# Patient Record
Sex: MALE | Race: WHITE | Employment: FULL TIME | URBAN - METROPOLITAN AREA
[De-identification: names, ages, dates, MRNs, and addresses within clinical notes are randomized per-mention and may not be internally consistent; named-entity substitution may affect disease eponyms.]

---

## 2017-03-22 ENCOUNTER — APPOINTMENT (OUTPATIENT)
Dept: LAB | Facility: HOSPITAL | Age: 52
End: 2017-03-22
Attending: INTERNAL MEDICINE
Payer: COMMERCIAL

## 2017-03-22 DIAGNOSIS — E78.00 HIGH CHOLESTEROL: ICD-10-CM

## 2017-03-22 LAB
ALBUMIN SERPL-MCNC: 3.7 G/DL (ref 3.5–4.8)
ALP LIVER SERPL-CCNC: 49 U/L (ref 45–117)
ALT SERPL-CCNC: 47 U/L (ref 17–63)
AST SERPL-CCNC: 18 U/L (ref 15–41)
BILIRUB SERPL-MCNC: 0.3 MG/DL (ref 0.1–2)
BUN BLD-MCNC: 12 MG/DL (ref 8–20)
CALCIUM BLD-MCNC: 8.9 MG/DL (ref 8.3–10.3)
CHLORIDE: 109 MMOL/L (ref 101–111)
CHOLEST SMN-MCNC: 203 MG/DL (ref ?–200)
CO2: 29 MMOL/L (ref 22–32)
CREAT BLD-MCNC: 1.07 MG/DL (ref 0.7–1.3)
GLUCOSE BLD-MCNC: 116 MG/DL (ref 70–99)
HDLC SERPL-MCNC: 38 MG/DL (ref 45–?)
HDLC SERPL: 5.34 {RATIO} (ref ?–4.97)
LDLC SERPL CALC-MCNC: 114 MG/DL (ref ?–130)
M PROTEIN MFR SERPL ELPH: 7.1 G/DL (ref 6.1–8.3)
NONHDLC SERPL-MCNC: 165 MG/DL (ref ?–130)
POTASSIUM SERPL-SCNC: 4.3 MMOL/L (ref 3.6–5.1)
SODIUM SERPL-SCNC: 143 MMOL/L (ref 136–144)
TRIGLYCERIDES: 257 MG/DL (ref ?–150)
VLDL: 51 MG/DL (ref 5–40)

## 2017-03-22 PROCEDURE — 80061 LIPID PANEL: CPT

## 2017-03-22 PROCEDURE — 80053 COMPREHEN METABOLIC PANEL: CPT

## 2017-03-22 PROCEDURE — 36415 COLL VENOUS BLD VENIPUNCTURE: CPT

## 2017-03-30 ENCOUNTER — OFFICE VISIT (OUTPATIENT)
Dept: INTERNAL MEDICINE CLINIC | Facility: CLINIC | Age: 52
End: 2017-03-30

## 2017-03-30 VITALS
HEART RATE: 84 BPM | RESPIRATION RATE: 16 BRPM | WEIGHT: 239 LBS | HEIGHT: 72 IN | DIASTOLIC BLOOD PRESSURE: 80 MMHG | SYSTOLIC BLOOD PRESSURE: 124 MMHG | BODY MASS INDEX: 32.37 KG/M2 | TEMPERATURE: 98 F

## 2017-03-30 DIAGNOSIS — E78.5 DYSLIPIDEMIA: Primary | ICD-10-CM

## 2017-03-30 DIAGNOSIS — R73.03 PRE-DIABETES: ICD-10-CM

## 2017-03-30 PROCEDURE — 99213 OFFICE O/P EST LOW 20 MIN: CPT | Performed by: INTERNAL MEDICINE

## 2017-03-30 RX ORDER — ROSUVASTATIN CALCIUM 5 MG/1
5 TABLET, COATED ORAL NIGHTLY
Qty: 90 TABLET | Refills: 2 | Status: SHIPPED | OUTPATIENT
Start: 2017-03-30 | End: 2019-05-17

## 2017-03-30 NOTE — PROGRESS NOTES
Patient presents with: Follow - Up: to review labs. HPI:  Here for f.u high chol and pre diabtes, sugar up a bit, chol down, tkain meds more, about 80% of the time. Eating poorly. Review of Systems   No f/c/chest pain or sob. No cough.  No abd pa edema    A/P:    Dyslipidemia  (primary encounter diagnosis)  Pre-diabetes    Continue meds, take more regularly  Rpt labs in 3 mos see me in 6 mos for cpe    Orders Placed This Encounter  CMP in 3 months  Hemoglobin A1C in 3 months    Meds & Refills for t

## 2017-06-20 ENCOUNTER — TELEPHONE (OUTPATIENT)
Dept: INTERNAL MEDICINE CLINIC | Facility: CLINIC | Age: 52
End: 2017-06-20

## 2017-06-23 ENCOUNTER — OFFICE VISIT (OUTPATIENT)
Dept: INTERNAL MEDICINE CLINIC | Facility: CLINIC | Age: 52
End: 2017-06-23

## 2017-06-23 VITALS
HEART RATE: 68 BPM | BODY MASS INDEX: 33.32 KG/M2 | SYSTOLIC BLOOD PRESSURE: 118 MMHG | WEIGHT: 238 LBS | DIASTOLIC BLOOD PRESSURE: 74 MMHG | TEMPERATURE: 98 F | RESPIRATION RATE: 16 BRPM | HEIGHT: 71 IN

## 2017-06-23 DIAGNOSIS — M79.671 HEEL PAIN, BILATERAL: ICD-10-CM

## 2017-06-23 DIAGNOSIS — R73.03 PRE-DIABETES: ICD-10-CM

## 2017-06-23 DIAGNOSIS — R73.9 HYPERGLYCEMIA: Primary | ICD-10-CM

## 2017-06-23 DIAGNOSIS — E78.5 DYSLIPIDEMIA: ICD-10-CM

## 2017-06-23 DIAGNOSIS — M79.672 HEEL PAIN, BILATERAL: ICD-10-CM

## 2017-06-23 PROCEDURE — 99214 OFFICE O/P EST MOD 30 MIN: CPT | Performed by: INTERNAL MEDICINE

## 2017-06-23 NOTE — PROGRESS NOTES
Patient presents with: Follow - Up      HPI:  Here for f/u high chol and pre diabetes. Pt notes poor compliance with daily crestor.    Notes ravin heel pain upon standing in am, no swelling, pain improves as he walks, worsens after resting then walking again Non tender, no masses, no organomegaly or hernias.   Musculoskeletal: No edema; ravin heel pain on exam    A/P:    Hyperglycemia  (primary encounter diagnosis)-stable continue observation, rpt lab snow  Dyslipidemia-see above, advised better adherence with me

## 2017-06-24 ENCOUNTER — APPOINTMENT (OUTPATIENT)
Dept: LAB | Facility: HOSPITAL | Age: 52
End: 2017-06-24
Attending: INTERNAL MEDICINE
Payer: COMMERCIAL

## 2017-06-24 DIAGNOSIS — R73.03 PRE-DIABETES: ICD-10-CM

## 2017-06-24 DIAGNOSIS — R73.9 HYPERGLYCEMIA: ICD-10-CM

## 2017-06-24 DIAGNOSIS — E78.5 DYSLIPIDEMIA: ICD-10-CM

## 2017-06-24 PROCEDURE — 80053 COMPREHEN METABOLIC PANEL: CPT

## 2017-06-24 PROCEDURE — 83036 HEMOGLOBIN GLYCOSYLATED A1C: CPT

## 2017-06-24 PROCEDURE — 36415 COLL VENOUS BLD VENIPUNCTURE: CPT

## 2017-06-24 PROCEDURE — 80061 LIPID PANEL: CPT

## 2017-06-26 DIAGNOSIS — R73.03 PRE-DIABETES: ICD-10-CM

## 2017-06-26 DIAGNOSIS — R73.9 HYPERGLYCEMIA: Primary | ICD-10-CM

## 2017-06-26 DIAGNOSIS — E78.5 DYSLIPIDEMIA: ICD-10-CM

## 2017-07-18 ENCOUNTER — HOSPITAL ENCOUNTER (OUTPATIENT)
Dept: GENERAL RADIOLOGY | Facility: HOSPITAL | Age: 52
Discharge: HOME | End: 2017-07-18
Attending: NURSE PRACTITIONER
Payer: COMMERCIAL

## 2017-07-18 ENCOUNTER — OFFICE VISIT (OUTPATIENT)
Dept: INTERNAL MEDICINE CLINIC | Facility: CLINIC | Age: 52
End: 2017-07-18

## 2017-07-18 VITALS
RESPIRATION RATE: 18 BRPM | BODY MASS INDEX: 33.43 KG/M2 | DIASTOLIC BLOOD PRESSURE: 68 MMHG | SYSTOLIC BLOOD PRESSURE: 104 MMHG | HEART RATE: 103 BPM | WEIGHT: 238.81 LBS | TEMPERATURE: 99 F | HEIGHT: 71 IN | OXYGEN SATURATION: 96 %

## 2017-07-18 DIAGNOSIS — R09.89 CHEST CONGESTION: Primary | ICD-10-CM

## 2017-07-18 DIAGNOSIS — J02.9 SORE THROAT: ICD-10-CM

## 2017-07-18 DIAGNOSIS — R09.89 CHEST CONGESTION: ICD-10-CM

## 2017-07-18 DIAGNOSIS — R05.9 COUGH: ICD-10-CM

## 2017-07-18 DIAGNOSIS — R50.9 FEVER, UNSPECIFIED FEVER CAUSE: ICD-10-CM

## 2017-07-18 PROCEDURE — 99214 OFFICE O/P EST MOD 30 MIN: CPT | Performed by: NURSE PRACTITIONER

## 2017-07-18 PROCEDURE — 71020 XR CHEST PA + LAT CHEST (CPT=71020): CPT | Performed by: NURSE PRACTITIONER

## 2017-07-18 NOTE — PROGRESS NOTES
Patient presents with:  Flu: Room 7. Chest congestion       HPI:  Presents with 3 day history of chest congestion, fatigue, cough with production of \"thick dark\" green mucous, sore throat, nasal drainage and low grade fevers.  Denies SOB, LEDEZMA, chest pain, rales appreciated to left lung. Frequent cough heard during exam.    Skin: Skin is warm and dry. No rash noted. No erythema. No pallor. A/P:    Chest congestion  (primary encounter diagnosis)- Check CXR. Further treatment dependent on results.  Nasal bed).     Get plenty of rest and drink extra fluids.              All questions were answered and the patient understands the plan.

## 2018-05-08 ENCOUNTER — TELEPHONE (OUTPATIENT)
Dept: INTERNAL MEDICINE CLINIC | Facility: CLINIC | Age: 53
End: 2018-05-08

## 2018-05-08 NOTE — TELEPHONE ENCOUNTER
Future Appointments  Date Time Provider Mary Lau   5/22/2018 11:15 AM Jannis Boast, MD [facility]

## 2018-05-14 ENCOUNTER — LAB ENCOUNTER (OUTPATIENT)
Dept: LAB | Facility: HOSPITAL | Age: 53
End: 2018-05-14
Attending: INTERNAL MEDICINE
Payer: COMMERCIAL

## 2018-05-14 DIAGNOSIS — E78.5 DYSLIPIDEMIA: ICD-10-CM

## 2018-05-14 DIAGNOSIS — R73.03 PRE-DIABETES: ICD-10-CM

## 2018-05-14 DIAGNOSIS — R73.9 HYPERGLYCEMIA: ICD-10-CM

## 2018-05-14 PROCEDURE — 80061 LIPID PANEL: CPT

## 2018-05-14 PROCEDURE — 36415 COLL VENOUS BLD VENIPUNCTURE: CPT

## 2018-05-14 PROCEDURE — 83036 HEMOGLOBIN GLYCOSYLATED A1C: CPT

## 2018-05-14 PROCEDURE — 80053 COMPREHEN METABOLIC PANEL: CPT

## 2018-05-22 ENCOUNTER — OFFICE VISIT (OUTPATIENT)
Dept: INTERNAL MEDICINE CLINIC | Facility: CLINIC | Age: 53
End: 2018-05-22

## 2018-05-22 VITALS
BODY MASS INDEX: 32.37 KG/M2 | HEART RATE: 76 BPM | DIASTOLIC BLOOD PRESSURE: 70 MMHG | HEIGHT: 72 IN | TEMPERATURE: 98 F | WEIGHT: 239 LBS | SYSTOLIC BLOOD PRESSURE: 116 MMHG | RESPIRATION RATE: 14 BRPM

## 2018-05-22 DIAGNOSIS — R73.03 PRE-DIABETES: ICD-10-CM

## 2018-05-22 DIAGNOSIS — Z00.00 PE (PHYSICAL EXAM), ANNUAL: Primary | ICD-10-CM

## 2018-05-22 DIAGNOSIS — K21.9 GASTROESOPHAGEAL REFLUX DISEASE, ESOPHAGITIS PRESENCE NOT SPECIFIED: ICD-10-CM

## 2018-05-22 DIAGNOSIS — R20.2 ARM PARESTHESIA, RIGHT: ICD-10-CM

## 2018-05-22 DIAGNOSIS — E78.5 DYSLIPIDEMIA: ICD-10-CM

## 2018-05-22 PROCEDURE — 99396 PREV VISIT EST AGE 40-64: CPT | Performed by: INTERNAL MEDICINE

## 2018-05-22 NOTE — PROGRESS NOTES
Patient presents with:  Physical: AB RM 9  Lab Results      HPI:  Here for cpe. Pt has left arm paresthesia intemttent entire length worse in am usually. Denies pain, recall left elbow injury fell onit durinig tennis. Lasting 3 mos, mild.    No other comm Mother    • Heart Disease Father      Smoking status: Light Tobacco Smoker                                                       Packs/day: 0.00      Years: 0.00         Types: Cigars  Smokeless tobacco: Never Used                      Comment: Cigars 3-4 Neurological: Normal reflexes. No cranial nerve deficit or sensory deficit. Normal muscle tone. Coordination normal.   Skin: Skin is warm and dry. No rash noted. No erythema. No pallor. Psychiatric: Normal mood and affect.      A/P:    Gastroesophageal

## 2018-05-30 ENCOUNTER — OFFICE VISIT (OUTPATIENT)
Dept: INTERNAL MEDICINE CLINIC | Facility: CLINIC | Age: 53
End: 2018-05-30

## 2018-05-30 VITALS
SYSTOLIC BLOOD PRESSURE: 122 MMHG | HEIGHT: 72 IN | BODY MASS INDEX: 32.37 KG/M2 | WEIGHT: 239 LBS | RESPIRATION RATE: 16 BRPM | TEMPERATURE: 99 F | OXYGEN SATURATION: 97 % | HEART RATE: 96 BPM | DIASTOLIC BLOOD PRESSURE: 70 MMHG

## 2018-05-30 DIAGNOSIS — J06.9 ACUTE URI: Primary | ICD-10-CM

## 2018-05-30 PROCEDURE — 99213 OFFICE O/P EST LOW 20 MIN: CPT | Performed by: PHYSICIAN ASSISTANT

## 2018-05-30 RX ORDER — AZITHROMYCIN 250 MG/1
TABLET, FILM COATED ORAL
Qty: 6 TABLET | Refills: 0 | Status: SHIPPED | OUTPATIENT
Start: 2018-05-30 | End: 2019-05-17 | Stop reason: ALTCHOICE

## 2018-05-30 NOTE — PROGRESS NOTES
HPI:   Siri Gonzalez is a 46year old male who presents for upper respiratory symptoms for  4  days.    Patient reports congestion, yellow colored nasal discharge, cough with yellow colored sputum, OTC cold meds have not been helping, denies fever, denies sin nourished,in no apparent distress  EYES:PERRLA,, conjunctiva are clear  HEENT: atraumatic, normocephalic, B TMs clear, MMM o/p with mild erythema of tonsillar pillars, no exudates  NECK: supple, no cervical adenopathy  LUNGS: clear to auscultation, no whee

## 2018-09-03 ENCOUNTER — OFFICE VISIT (OUTPATIENT)
Dept: FAMILY MEDICINE CLINIC | Facility: CLINIC | Age: 53
End: 2018-09-03
Payer: COMMERCIAL

## 2018-09-03 VITALS
HEIGHT: 72 IN | TEMPERATURE: 99 F | SYSTOLIC BLOOD PRESSURE: 122 MMHG | WEIGHT: 235 LBS | DIASTOLIC BLOOD PRESSURE: 72 MMHG | HEART RATE: 99 BPM | OXYGEN SATURATION: 98 % | BODY MASS INDEX: 31.83 KG/M2

## 2018-09-03 DIAGNOSIS — J06.9 UPPER RESPIRATORY TRACT INFECTION, UNSPECIFIED TYPE: Primary | ICD-10-CM

## 2018-09-03 PROCEDURE — 99213 OFFICE O/P EST LOW 20 MIN: CPT | Performed by: PHYSICIAN ASSISTANT

## 2018-09-03 NOTE — PROGRESS NOTES
CHIEF COMPLAINT:   Patient presents with:  Chest Congestion: cough is with phlegm, runny nose x 1 dy       HPI:   Janelle Smith is a 46year old male who presents for upper respiratory symptoms for  1 days.  Patient reports congestion, cough is keeping pt up GENERAL: well developed, well nourished,in no apparent distress  SKIN: no rashes,no suspicious lesions  HEAD: atraumatic, normocephalic.  no tenderness on palpation of  sinuses  EYES: conjunctiva clear, EOM intact  EARS: TM's nl, no bulging, noretraction,n You have a viral upper respiratory illness (URI), which is another term for the common cold. This illness is contagious during the first few days. It is spread through the air by coughing and sneezing.  It may also be spread by direct contact (touching the · Cough with lots of colored sputum (mucus)  · Severe headache; face, neck, or ear pain  · Difficulty swallowing due to throat pain  · Fever of 100.4°F (38°C) or higher, or as directed by your healthcare provider  Call 911  Call 911 if any of these occur:

## 2018-09-06 ENCOUNTER — PROCEDURE VISIT (OUTPATIENT)
Dept: NEUROLOGY | Facility: CLINIC | Age: 53
End: 2018-09-06
Payer: COMMERCIAL

## 2018-09-06 DIAGNOSIS — G56.03 BILATERAL CARPAL TUNNEL SYNDROME: ICD-10-CM

## 2018-09-06 PROCEDURE — 95911 NRV CNDJ TEST 9-10 STUDIES: CPT | Performed by: OTHER

## 2018-09-06 PROCEDURE — 95886 MUSC TEST DONE W/N TEST COMP: CPT | Performed by: OTHER

## 2018-09-06 NOTE — PROCEDURES
[facility]  [address] [facility]  Phone- [phone]  Nerve Conduction & Electromyography Report            Patient: Doe Lew Age: 46 Years 4 Months  Patient ID: [id number] Referring MPATRIA: Schriedel Se Wrist APB 4.38 ?4.40 7.8 ?6.0 100  100 ?50.00 Wrist - APB 7        Elbow APB 8.75  7.3  92.9 ?115 100  Elbow - Wrist 22 50 ?49   R ULNAR - ADM      Wrist ADM 2.66 ?3.60 9.8 ?5.0 100  72.7 ?50.00 Wrist - ADM 7        B. Elbow ADM 6.04  9.3  94.5 ?115 73. 6 There is no electrodiagnostic evidence of a cervical radiculopathy at this time. Anu Stockton D.O.   Neurology

## 2018-09-06 NOTE — PATIENT INSTRUCTIONS
Refill policies:    • Allow 2-3 business days for refills; controlled substances may take longer.   • Contact your pharmacy at least 5 days prior to running out of medication and have them send an electronic request or submit request through the “request re entire amount billed. Precertification and Prior Authorizations: If your physician has recommended that you have a procedure or additional testing performed.   [facility]) will contact your insurance carrier to obtain pre-certi

## 2019-05-16 ENCOUNTER — TELEPHONE (OUTPATIENT)
Dept: INTERNAL MEDICINE CLINIC | Facility: CLINIC | Age: 54
End: 2019-05-16

## 2019-05-16 DIAGNOSIS — E78.5 DYSLIPIDEMIA: ICD-10-CM

## 2019-05-16 DIAGNOSIS — Z12.5 SCREENING FOR MALIGNANT NEOPLASM OF PROSTATE: ICD-10-CM

## 2019-05-16 DIAGNOSIS — R73.9 HYPERGLYCEMIA: ICD-10-CM

## 2019-05-16 DIAGNOSIS — R73.03 PRE-DIABETES: Primary | ICD-10-CM

## 2019-05-16 DIAGNOSIS — K21.9 GASTROESOPHAGEAL REFLUX DISEASE WITHOUT ESOPHAGITIS: ICD-10-CM

## 2019-05-16 NOTE — TELEPHONE ENCOUNTER
Future Appointments   Date Time Provider Mary Lau   5/17/2019 11:45 AM Jacque Pereira MD [facility]   5/31/2019  9:45 AM Karlos Hinkle MD [facility]     Orders to edward- Pt aware to fast-no call back required

## 2019-05-17 ENCOUNTER — OFFICE VISIT (OUTPATIENT)
Dept: INTERNAL MEDICINE CLINIC | Facility: CLINIC | Age: 54
End: 2019-05-17
Payer: COMMERCIAL

## 2019-05-17 VITALS
HEIGHT: 72 IN | RESPIRATION RATE: 16 BRPM | HEART RATE: 104 BPM | SYSTOLIC BLOOD PRESSURE: 102 MMHG | WEIGHT: 240 LBS | BODY MASS INDEX: 32.51 KG/M2 | DIASTOLIC BLOOD PRESSURE: 60 MMHG | TEMPERATURE: 99 F

## 2019-05-17 DIAGNOSIS — J01.90 ACUTE RHINOSINUSITIS: Primary | ICD-10-CM

## 2019-05-17 PROCEDURE — 99213 OFFICE O/P EST LOW 20 MIN: CPT | Performed by: INTERNAL MEDICINE

## 2019-05-17 NOTE — PROGRESS NOTES
Farida Jacobs  4/15/1965    Patient presents with:  Cough: SN Rm 7; x 20 days, productive cough, fatigue, some SOB      SUBJECTIVE   Farida Jacobs is a 53year old male who presents with nasal and sinus congestion, with cough.     Approximately 10 days ago foll Position: Sitting, Cuff Size: adult)   Pulse 104   Temp 98.5 °F (36.9 °C) (Oral)   Resp 16   Ht 72\"   Wt 240 lb   BMI 32.55 kg/m²   Constitutional: Oriented to person, place, and time. No distress. HEENT:  Normocephalic and atraumatic. TM's wnl.   Maxilla

## 2019-07-23 ENCOUNTER — LAB ENCOUNTER (OUTPATIENT)
Dept: LAB | Facility: HOSPITAL | Age: 54
End: 2019-07-23
Attending: INTERNAL MEDICINE
Payer: COMMERCIAL

## 2019-07-23 DIAGNOSIS — R73.03 PRE-DIABETES: ICD-10-CM

## 2019-07-23 DIAGNOSIS — Z12.5 SCREENING FOR MALIGNANT NEOPLASM OF PROSTATE: ICD-10-CM

## 2019-07-23 DIAGNOSIS — E78.5 DYSLIPIDEMIA: ICD-10-CM

## 2019-07-23 DIAGNOSIS — R73.9 HYPERGLYCEMIA: ICD-10-CM

## 2019-07-23 DIAGNOSIS — K21.9 GASTROESOPHAGEAL REFLUX DISEASE WITHOUT ESOPHAGITIS: ICD-10-CM

## 2019-07-23 LAB
ALBUMIN SERPL-MCNC: 3.7 G/DL (ref 3.4–5)
ALBUMIN/GLOB SERPL: 1.1 {RATIO} (ref 1–2)
ALP LIVER SERPL-CCNC: 46 U/L (ref 45–117)
ALT SERPL-CCNC: 46 U/L (ref 16–61)
ANION GAP SERPL CALC-SCNC: 7 MMOL/L (ref 0–18)
AST SERPL-CCNC: 23 U/L (ref 15–37)
BASOPHILS # BLD AUTO: 0.08 X10(3) UL (ref 0–0.2)
BASOPHILS NFR BLD AUTO: 1.3 %
BILIRUB SERPL-MCNC: 0.6 MG/DL (ref 0.1–2)
BUN BLD-MCNC: 18 MG/DL (ref 7–18)
BUN/CREAT SERPL: 15.5 (ref 10–20)
CALCIUM BLD-MCNC: 9 MG/DL (ref 8.5–10.1)
CHLORIDE SERPL-SCNC: 110 MMOL/L (ref 98–112)
CHOLEST SMN-MCNC: 233 MG/DL (ref ?–200)
CO2 SERPL-SCNC: 25 MMOL/L (ref 21–32)
COMPLEXED PSA SERPL-MCNC: 3.11 NG/ML (ref ?–4)
CREAT BLD-MCNC: 1.16 MG/DL (ref 0.7–1.3)
DEPRECATED RDW RBC AUTO: 44.4 FL (ref 35.1–46.3)
EOSINOPHIL # BLD AUTO: 0.25 X10(3) UL (ref 0–0.7)
EOSINOPHIL NFR BLD AUTO: 4.1 %
ERYTHROCYTE [DISTWIDTH] IN BLOOD BY AUTOMATED COUNT: 13.2 % (ref 11–15)
EST. AVERAGE GLUCOSE BLD GHB EST-MCNC: 137 MG/DL (ref 68–126)
GLOBULIN PLAS-MCNC: 3.5 G/DL (ref 2.8–4.4)
GLUCOSE BLD-MCNC: 113 MG/DL (ref 70–99)
HBA1C MFR BLD HPLC: 6.4 % (ref ?–5.7)
HCT VFR BLD AUTO: 45.6 % (ref 39–53)
HDLC SERPL-MCNC: 38 MG/DL (ref 40–59)
HGB BLD-MCNC: 15.1 G/DL (ref 13–17.5)
IMM GRANULOCYTES # BLD AUTO: 0.03 X10(3) UL (ref 0–1)
IMM GRANULOCYTES NFR BLD: 0.5 %
LDLC SERPL CALC-MCNC: 130 MG/DL (ref ?–100)
LYMPHOCYTES # BLD AUTO: 2.4 X10(3) UL (ref 1–4)
LYMPHOCYTES NFR BLD AUTO: 39.2 %
M PROTEIN MFR SERPL ELPH: 7.2 G/DL (ref 6.4–8.2)
MCH RBC QN AUTO: 30.6 PG (ref 26–34)
MCHC RBC AUTO-ENTMCNC: 33.1 G/DL (ref 31–37)
MCV RBC AUTO: 92.3 FL (ref 80–100)
MONOCYTES # BLD AUTO: 0.56 X10(3) UL (ref 0.1–1)
MONOCYTES NFR BLD AUTO: 9.2 %
NEUTROPHILS # BLD AUTO: 2.8 X10 (3) UL (ref 1.5–7.7)
NEUTROPHILS # BLD AUTO: 2.8 X10(3) UL (ref 1.5–7.7)
NEUTROPHILS NFR BLD AUTO: 45.7 %
NONHDLC SERPL-MCNC: 195 MG/DL (ref ?–130)
OSMOLALITY SERPL CALC.SUM OF ELEC: 297 MOSM/KG (ref 275–295)
PLATELET # BLD AUTO: 192 10(3)UL (ref 150–450)
POTASSIUM SERPL-SCNC: 4.3 MMOL/L (ref 3.5–5.1)
RBC # BLD AUTO: 4.94 X10(6)UL (ref 4.3–5.7)
SODIUM SERPL-SCNC: 142 MMOL/L (ref 136–145)
TRIGL SERPL-MCNC: 327 MG/DL (ref 30–149)
TSI SER-ACNC: 1.31 MIU/ML (ref 0.36–3.74)
VLDLC SERPL CALC-MCNC: 65 MG/DL (ref 0–30)
WBC # BLD AUTO: 6.1 X10(3) UL (ref 4–11)

## 2019-07-23 PROCEDURE — 83036 HEMOGLOBIN GLYCOSYLATED A1C: CPT

## 2019-07-23 PROCEDURE — 85025 COMPLETE CBC W/AUTO DIFF WBC: CPT

## 2019-07-23 PROCEDURE — 80061 LIPID PANEL: CPT

## 2019-07-23 PROCEDURE — 36415 COLL VENOUS BLD VENIPUNCTURE: CPT

## 2019-07-23 PROCEDURE — 80053 COMPREHEN METABOLIC PANEL: CPT

## 2019-07-23 PROCEDURE — 84443 ASSAY THYROID STIM HORMONE: CPT

## 2019-09-06 ENCOUNTER — OFFICE VISIT (OUTPATIENT)
Dept: INTERNAL MEDICINE CLINIC | Facility: CLINIC | Age: 54
End: 2019-09-06
Payer: COMMERCIAL

## 2019-09-06 VITALS
BODY MASS INDEX: 32.64 KG/M2 | OXYGEN SATURATION: 97 % | HEIGHT: 72 IN | WEIGHT: 241 LBS | SYSTOLIC BLOOD PRESSURE: 122 MMHG | DIASTOLIC BLOOD PRESSURE: 82 MMHG | RESPIRATION RATE: 16 BRPM | TEMPERATURE: 98 F | HEART RATE: 98 BPM

## 2019-09-06 DIAGNOSIS — R73.03 PRE-DIABETES: ICD-10-CM

## 2019-09-06 DIAGNOSIS — Z00.00 PE (PHYSICAL EXAM), ANNUAL: Primary | ICD-10-CM

## 2019-09-06 DIAGNOSIS — E78.5 DYSLIPIDEMIA: ICD-10-CM

## 2019-09-06 DIAGNOSIS — Z23 NEED FOR VACCINATION: ICD-10-CM

## 2019-09-06 PROCEDURE — 90471 IMMUNIZATION ADMIN: CPT | Performed by: INTERNAL MEDICINE

## 2019-09-06 PROCEDURE — 99396 PREV VISIT EST AGE 40-64: CPT | Performed by: INTERNAL MEDICINE

## 2019-09-06 PROCEDURE — 90686 IIV4 VACC NO PRSV 0.5 ML IM: CPT | Performed by: INTERNAL MEDICINE

## 2019-09-06 RX ORDER — METFORMIN HYDROCHLORIDE 500 MG/1
500 TABLET, EXTENDED RELEASE ORAL DAILY
Qty: 90 TABLET | Refills: 1 | Status: SHIPPED | OUTPATIENT
Start: 2019-09-06 | End: 2019-12-09

## 2019-09-06 RX ORDER — ROSUVASTATIN CALCIUM 5 MG/1
5 TABLET, COATED ORAL NIGHTLY
Qty: 90 TABLET | Refills: 2 | Status: SHIPPED | OUTPATIENT
Start: 2019-09-06 | End: 2019-12-09

## 2019-09-06 NOTE — PROGRESS NOTES
Patient presents with:  Physical      HPI:  Here for cpe. Has inceased sugar and chol, not taking meds, eating poorly. Review of Systems   Constitutional: Negative for fever, chills and fatigue. No distress.   HENT: Negative for hearing loss, congesti Tobacco comment: Cigars 3-4    Alcohol use:  Yes      Alcohol/week: 0.0 standard drinks      Frequency: Monthly or less      Comment: Cage done 5/22/2018    Drug use: No        Current Outpatient Medications:  Rosuvastatin Calcium 5 MG Oral Tab Take 1 table sensory deficit. Normal muscle tone. Coordination normal.   Skin: Skin is warm and dry. No rash noted. No erythema. No pallor. Psychiatric: Normal mood and affect.    Diabetic foot exam: Normal barefoot bilateral monofilament foot exam, no wounds on visua

## 2019-12-03 ENCOUNTER — LAB ENCOUNTER (OUTPATIENT)
Dept: LAB | Facility: HOSPITAL | Age: 54
End: 2019-12-03
Attending: INTERNAL MEDICINE
Payer: COMMERCIAL

## 2019-12-03 DIAGNOSIS — E78.5 DYSLIPIDEMIA: ICD-10-CM

## 2019-12-03 DIAGNOSIS — R73.03 PRE-DIABETES: ICD-10-CM

## 2019-12-03 PROCEDURE — 82570 ASSAY OF URINE CREATININE: CPT

## 2019-12-03 PROCEDURE — 36415 COLL VENOUS BLD VENIPUNCTURE: CPT

## 2019-12-03 PROCEDURE — 80061 LIPID PANEL: CPT

## 2019-12-03 PROCEDURE — 83036 HEMOGLOBIN GLYCOSYLATED A1C: CPT

## 2019-12-03 PROCEDURE — 82043 UR ALBUMIN QUANTITATIVE: CPT

## 2019-12-03 PROCEDURE — 80053 COMPREHEN METABOLIC PANEL: CPT

## 2019-12-09 ENCOUNTER — OFFICE VISIT (OUTPATIENT)
Dept: INTERNAL MEDICINE CLINIC | Facility: CLINIC | Age: 54
End: 2019-12-09
Payer: COMMERCIAL

## 2019-12-09 VITALS
RESPIRATION RATE: 16 BRPM | SYSTOLIC BLOOD PRESSURE: 128 MMHG | HEIGHT: 72 IN | HEART RATE: 92 BPM | WEIGHT: 237 LBS | TEMPERATURE: 99 F | OXYGEN SATURATION: 98 % | BODY MASS INDEX: 32.1 KG/M2 | DIASTOLIC BLOOD PRESSURE: 78 MMHG

## 2019-12-09 DIAGNOSIS — R73.03 PRE-DIABETES: ICD-10-CM

## 2019-12-09 DIAGNOSIS — E78.5 DYSLIPIDEMIA: ICD-10-CM

## 2019-12-09 PROCEDURE — 99213 OFFICE O/P EST LOW 20 MIN: CPT | Performed by: INTERNAL MEDICINE

## 2019-12-09 RX ORDER — METFORMIN HYDROCHLORIDE 500 MG/1
500 TABLET, EXTENDED RELEASE ORAL DAILY
Qty: 90 TABLET | Refills: 2 | Status: SHIPPED | OUTPATIENT
Start: 2019-12-09 | End: 2020-09-17

## 2019-12-09 RX ORDER — ROSUVASTATIN CALCIUM 5 MG/1
5 TABLET, COATED ORAL NIGHTLY
Qty: 90 TABLET | Refills: 2 | Status: SHIPPED | OUTPATIENT
Start: 2019-12-09 | End: 2020-09-17

## 2019-12-09 RX ORDER — ROSUVASTATIN CALCIUM 5 MG/1
5 TABLET, COATED ORAL NIGHTLY
Qty: 90 TABLET | Refills: 2 | Status: SHIPPED | OUTPATIENT
Start: 2019-12-09 | End: 2019-12-09

## 2019-12-09 RX ORDER — METFORMIN HYDROCHLORIDE 500 MG/1
500 TABLET, EXTENDED RELEASE ORAL DAILY
Qty: 90 TABLET | Refills: 1 | Status: SHIPPED | OUTPATIENT
Start: 2019-12-09 | End: 2019-12-09

## 2019-12-09 NOTE — PROGRESS NOTES
Patient presents with:  Lab Results: RG rm 8 F/u labs      HPI:  Here for f/u highchol and pre dm, much improved on meds, no se's. Feels well. Review of Systems   No f/c/chest pain or sob. No cough. No abd pain/n/v/d. No ha or dizziness.  No numbness, t A/P:    Dyslipidemia  Pre-diabetes  Much improved, continue meds  Rpt labs and see me next summer for cpe  No orders of the defined types were placed in this encounter.       Meds & Refills for this Visit:  Requested Prescriptions     Signed Prescriptio

## 2020-09-15 DIAGNOSIS — E78.5 DYSLIPIDEMIA: ICD-10-CM

## 2020-09-15 DIAGNOSIS — R73.03 PRE-DIABETES: ICD-10-CM

## 2020-09-17 RX ORDER — ROSUVASTATIN CALCIUM 5 MG/1
TABLET, COATED ORAL
Qty: 90 TABLET | Refills: 0 | Status: SHIPPED | OUTPATIENT
Start: 2020-09-17 | End: 2020-12-15

## 2020-09-17 RX ORDER — METFORMIN HYDROCHLORIDE 500 MG/1
TABLET, EXTENDED RELEASE ORAL
Qty: 90 TABLET | Refills: 2 | Status: SHIPPED | OUTPATIENT
Start: 2020-09-17 | End: 2021-03-12

## 2020-09-17 NOTE — TELEPHONE ENCOUNTER
Rosuvastatin-PASSED per protocol, refill sent     Last OV 12.9.19 w/ AS (lab results)   Last PE 9.6.19-Overdue   Last REFILL 12.9.19 Metformin ER 500mg #90 2R  Last LABS 12. 3.19 Microalb, HgA1c, Lipid, CMP     No future appointments. Per PROTOCOL?  DAV

## 2020-12-14 ENCOUNTER — TELEPHONE (OUTPATIENT)
Dept: INTERNAL MEDICINE CLINIC | Facility: CLINIC | Age: 55
End: 2020-12-14

## 2020-12-14 DIAGNOSIS — Z13.29 SCREENING FOR THYROID DISORDER: ICD-10-CM

## 2020-12-14 DIAGNOSIS — Z13.0 SCREENING FOR ENDOCRINE, METABOLIC AND IMMUNITY DISORDER: ICD-10-CM

## 2020-12-14 DIAGNOSIS — Z13.228 SCREENING FOR ENDOCRINE, METABOLIC AND IMMUNITY DISORDER: ICD-10-CM

## 2020-12-14 DIAGNOSIS — Z00.00 ROUTINE GENERAL MEDICAL EXAMINATION AT A HEALTH CARE FACILITY: Primary | ICD-10-CM

## 2020-12-14 DIAGNOSIS — Z12.5 SCREENING PSA (PROSTATE SPECIFIC ANTIGEN): ICD-10-CM

## 2020-12-14 DIAGNOSIS — R73.03 PRE-DIABETES: ICD-10-CM

## 2020-12-14 DIAGNOSIS — Z13.29 SCREENING FOR ENDOCRINE, METABOLIC AND IMMUNITY DISORDER: ICD-10-CM

## 2020-12-14 DIAGNOSIS — Z13.220 SCREENING, LIPID: ICD-10-CM

## 2020-12-14 DIAGNOSIS — E78.5 DYSLIPIDEMIA: ICD-10-CM

## 2020-12-14 DIAGNOSIS — Z13.0 SCREENING FOR BLOOD DISEASE: ICD-10-CM

## 2020-12-14 NOTE — TELEPHONE ENCOUNTER
Last OV 12.9.19 w/ AS (f/up on labs)   Last PE 9.6.19-Overdue   Last REFILL 9.17.20 Rosuvastatin 5mg #90 0R  Last LABS No recent labs within last 12 months     No future appointments. Per PROTOCOL?  FAILED-whole protocol     Please Advise

## 2020-12-15 RX ORDER — ROSUVASTATIN CALCIUM 5 MG/1
TABLET, COATED ORAL
Qty: 90 TABLET | Refills: 0 | Status: SHIPPED | OUTPATIENT
Start: 2020-12-15 | End: 2021-03-12

## 2020-12-22 NOTE — TELEPHONE ENCOUNTER
Future Appointments   Date Time Provider Mary Judi   3/5/2021  8:00 AM REFERENCE [id number]   3/12/2021  7:00 AM Mikhail Lyles MD [facility]     Orders to edward- Pt informed that labs need to be completed no sooner th

## 2021-03-05 ENCOUNTER — LAB ENCOUNTER (OUTPATIENT)
Dept: LAB | Age: 56
End: 2021-03-05
Attending: INTERNAL MEDICINE
Payer: COMMERCIAL

## 2021-03-05 DIAGNOSIS — E78.5 DYSLIPIDEMIA: ICD-10-CM

## 2021-03-05 DIAGNOSIS — Z13.0 SCREENING FOR BLOOD DISEASE: ICD-10-CM

## 2021-03-05 DIAGNOSIS — Z13.220 SCREENING, LIPID: ICD-10-CM

## 2021-03-05 DIAGNOSIS — Z13.228 SCREENING FOR ENDOCRINE, METABOLIC AND IMMUNITY DISORDER: ICD-10-CM

## 2021-03-05 DIAGNOSIS — Z12.5 SCREENING PSA (PROSTATE SPECIFIC ANTIGEN): ICD-10-CM

## 2021-03-05 DIAGNOSIS — Z00.00 ROUTINE GENERAL MEDICAL EXAMINATION AT A HEALTH CARE FACILITY: ICD-10-CM

## 2021-03-05 DIAGNOSIS — R73.03 PRE-DIABETES: ICD-10-CM

## 2021-03-05 DIAGNOSIS — Z13.0 SCREENING FOR ENDOCRINE, METABOLIC AND IMMUNITY DISORDER: ICD-10-CM

## 2021-03-05 DIAGNOSIS — Z13.29 SCREENING FOR THYROID DISORDER: ICD-10-CM

## 2021-03-05 DIAGNOSIS — Z13.29 SCREENING FOR ENDOCRINE, METABOLIC AND IMMUNITY DISORDER: ICD-10-CM

## 2021-03-05 LAB
ALBUMIN SERPL-MCNC: 3.9 G/DL (ref 3.4–5)
ALBUMIN/GLOB SERPL: 1.1 {RATIO} (ref 1–2)
ALP LIVER SERPL-CCNC: 46 U/L
ALT SERPL-CCNC: 44 U/L
ANION GAP SERPL CALC-SCNC: 6 MMOL/L (ref 0–18)
AST SERPL-CCNC: 16 U/L (ref 15–37)
BASOPHILS # BLD AUTO: 0.07 X10(3) UL (ref 0–0.2)
BASOPHILS NFR BLD AUTO: 1 %
BILIRUB SERPL-MCNC: 0.6 MG/DL (ref 0.1–2)
BUN BLD-MCNC: 16 MG/DL (ref 7–18)
BUN/CREAT SERPL: 13.7 (ref 10–20)
CALCIUM BLD-MCNC: 9.1 MG/DL (ref 8.5–10.1)
CHLORIDE SERPL-SCNC: 109 MMOL/L (ref 98–112)
CHOLEST SMN-MCNC: 157 MG/DL (ref ?–200)
CO2 SERPL-SCNC: 27 MMOL/L (ref 21–32)
COMPLEXED PSA SERPL-MCNC: 3.78 NG/ML (ref ?–4)
CREAT BLD-MCNC: 1.17 MG/DL
CREAT UR-SCNC: 299 MG/DL
DEPRECATED RDW RBC AUTO: 45 FL (ref 35.1–46.3)
EOSINOPHIL # BLD AUTO: 0.26 X10(3) UL (ref 0–0.7)
EOSINOPHIL NFR BLD AUTO: 3.6 %
ERYTHROCYTE [DISTWIDTH] IN BLOOD BY AUTOMATED COUNT: 13.1 % (ref 11–15)
EST. AVERAGE GLUCOSE BLD GHB EST-MCNC: 146 MG/DL (ref 68–126)
GLOBULIN PLAS-MCNC: 3.4 G/DL (ref 2.8–4.4)
GLUCOSE BLD-MCNC: 109 MG/DL (ref 70–99)
HBA1C MFR BLD HPLC: 6.7 % (ref ?–5.7)
HCT VFR BLD AUTO: 47.4 %
HDLC SERPL-MCNC: 45 MG/DL (ref 40–59)
HGB BLD-MCNC: 15.7 G/DL
IMM GRANULOCYTES # BLD AUTO: 0.01 X10(3) UL (ref 0–1)
IMM GRANULOCYTES NFR BLD: 0.1 %
LDLC SERPL CALC-MCNC: 85 MG/DL (ref ?–100)
LYMPHOCYTES # BLD AUTO: 2.59 X10(3) UL (ref 1–4)
LYMPHOCYTES NFR BLD AUTO: 36 %
M PROTEIN MFR SERPL ELPH: 7.3 G/DL (ref 6.4–8.2)
MCH RBC QN AUTO: 31.3 PG (ref 26–34)
MCHC RBC AUTO-ENTMCNC: 33.1 G/DL (ref 31–37)
MCV RBC AUTO: 94.4 FL
MICROALBUMIN UR-MCNC: 4.4 MG/DL
MICROALBUMIN/CREAT 24H UR-RTO: 14.7 UG/MG (ref ?–30)
MONOCYTES # BLD AUTO: 0.58 X10(3) UL (ref 0.1–1)
MONOCYTES NFR BLD AUTO: 8.1 %
NEUTROPHILS # BLD AUTO: 3.69 X10 (3) UL (ref 1.5–7.7)
NEUTROPHILS # BLD AUTO: 3.69 X10(3) UL (ref 1.5–7.7)
NEUTROPHILS NFR BLD AUTO: 51.2 %
NONHDLC SERPL-MCNC: 112 MG/DL (ref ?–130)
OSMOLALITY SERPL CALC.SUM OF ELEC: 296 MOSM/KG (ref 275–295)
PATIENT FASTING Y/N/NP: YES
PATIENT FASTING Y/N/NP: YES
PLATELET # BLD AUTO: 214 10(3)UL (ref 150–450)
POTASSIUM SERPL-SCNC: 4.5 MMOL/L (ref 3.5–5.1)
RBC # BLD AUTO: 5.02 X10(6)UL
SODIUM SERPL-SCNC: 142 MMOL/L (ref 136–145)
TRIGL SERPL-MCNC: 137 MG/DL (ref 30–149)
TSI SER-ACNC: 1.18 MIU/ML (ref 0.36–3.74)
VLDLC SERPL CALC-MCNC: 27 MG/DL (ref 0–30)
WBC # BLD AUTO: 7.2 X10(3) UL (ref 4–11)

## 2021-03-05 PROCEDURE — 36415 COLL VENOUS BLD VENIPUNCTURE: CPT | Performed by: INTERNAL MEDICINE

## 2021-03-05 PROCEDURE — 84153 ASSAY OF PSA TOTAL: CPT | Performed by: INTERNAL MEDICINE

## 2021-03-05 PROCEDURE — 80061 LIPID PANEL: CPT | Performed by: INTERNAL MEDICINE

## 2021-03-05 PROCEDURE — 80050 GENERAL HEALTH PANEL: CPT | Performed by: INTERNAL MEDICINE

## 2021-03-05 PROCEDURE — 83036 HEMOGLOBIN GLYCOSYLATED A1C: CPT | Performed by: INTERNAL MEDICINE

## 2021-03-05 PROCEDURE — 82043 UR ALBUMIN QUANTITATIVE: CPT | Performed by: INTERNAL MEDICINE

## 2021-03-05 PROCEDURE — 82570 ASSAY OF URINE CREATININE: CPT | Performed by: INTERNAL MEDICINE

## 2021-03-12 ENCOUNTER — OFFICE VISIT (OUTPATIENT)
Dept: INTERNAL MEDICINE CLINIC | Facility: CLINIC | Age: 56
End: 2021-03-12
Payer: COMMERCIAL

## 2021-03-12 VITALS
BODY MASS INDEX: 31.37 KG/M2 | HEIGHT: 72 IN | TEMPERATURE: 97 F | WEIGHT: 231.63 LBS | DIASTOLIC BLOOD PRESSURE: 74 MMHG | RESPIRATION RATE: 18 BRPM | SYSTOLIC BLOOD PRESSURE: 116 MMHG

## 2021-03-12 DIAGNOSIS — S39.012A STRAIN OF LUMBAR REGION, INITIAL ENCOUNTER: ICD-10-CM

## 2021-03-12 DIAGNOSIS — E78.5 DYSLIPIDEMIA: ICD-10-CM

## 2021-03-12 DIAGNOSIS — Z00.00 PE (PHYSICAL EXAM), ANNUAL: Primary | ICD-10-CM

## 2021-03-12 DIAGNOSIS — E11.9 TYPE 2 DIABETES MELLITUS WITHOUT COMPLICATION, WITHOUT LONG-TERM CURRENT USE OF INSULIN (HCC): ICD-10-CM

## 2021-03-12 PROCEDURE — 3008F BODY MASS INDEX DOCD: CPT | Performed by: INTERNAL MEDICINE

## 2021-03-12 PROCEDURE — 3074F SYST BP LT 130 MM HG: CPT | Performed by: INTERNAL MEDICINE

## 2021-03-12 PROCEDURE — 99213 OFFICE O/P EST LOW 20 MIN: CPT | Performed by: INTERNAL MEDICINE

## 2021-03-12 PROCEDURE — 3078F DIAST BP <80 MM HG: CPT | Performed by: INTERNAL MEDICINE

## 2021-03-12 PROCEDURE — 99396 PREV VISIT EST AGE 40-64: CPT | Performed by: INTERNAL MEDICINE

## 2021-03-12 RX ORDER — METFORMIN HYDROCHLORIDE 500 MG/1
500 TABLET, EXTENDED RELEASE ORAL DAILY
Qty: 90 TABLET | Refills: 2 | Status: SHIPPED | OUTPATIENT
Start: 2021-03-12 | End: 2021-07-21

## 2021-03-12 RX ORDER — ROSUVASTATIN CALCIUM 5 MG/1
5 TABLET, COATED ORAL NIGHTLY
Qty: 90 TABLET | Refills: 3 | Status: SHIPPED | OUTPATIENT
Start: 2021-03-12 | End: 2021-03-15

## 2021-03-12 RX ORDER — NAPROXEN 500 MG/1
500 TABLET ORAL 2 TIMES DAILY WITH MEALS
Qty: 20 TABLET | Refills: 0 | Status: SHIPPED | OUTPATIENT
Start: 2021-03-12

## 2021-03-12 NOTE — PROGRESS NOTES
Patient presents with:  Wellness Visit: MR rm 8 annual pe       HPI:  Here for cpe. Pt notes low back pain for a few mos, sitting more, then tweaked it at tennis. No numbness tingling or weakness. Ibuprofen helps. Ow officially dm2, see labs, reviwed. Disease Father      Social History    Tobacco Use      Smoking status: Light Tobacco Smoker        Types: Cigars      Smokeless tobacco: Never Used      Tobacco comment: Cigars 3-4    Vaping Use      Vaping Use: Never used    Alcohol use:  Yes      Alcohol/ hernias. Musculoskeletal: Normal range of motion. No edema and no tenderness. No effusions. back non tender. Lymphadenopathy: No cervical adenopathy. Neurological: Normal reflexes. No cranial nerve deficit or sensory deficit. Normal muscle tone.  Coord

## 2021-03-14 DIAGNOSIS — E78.5 DYSLIPIDEMIA: ICD-10-CM

## 2021-03-15 RX ORDER — ROSUVASTATIN CALCIUM 5 MG/1
TABLET, COATED ORAL
Qty: 90 TABLET | Refills: 1 | Status: SHIPPED | OUTPATIENT
Start: 2021-03-15 | End: 2021-07-21

## 2021-03-20 DIAGNOSIS — Z23 NEED FOR VACCINATION: ICD-10-CM

## 2021-03-26 ENCOUNTER — IMMUNIZATION (OUTPATIENT)
Dept: LAB | Age: 56
End: 2021-03-26
Attending: HOSPITALIST
Payer: COMMERCIAL

## 2021-03-26 DIAGNOSIS — Z23 NEED FOR VACCINATION: Primary | ICD-10-CM

## 2021-03-26 PROCEDURE — 0001A SARSCOV2 VAC 30MCG/0.3ML IM: CPT | Performed by: NURSE PRACTITIONER

## 2021-04-16 ENCOUNTER — IMMUNIZATION (OUTPATIENT)
Dept: LAB | Age: 56
End: 2021-04-16
Attending: NURSE PRACTITIONER
Payer: COMMERCIAL

## 2021-04-16 DIAGNOSIS — Z23 NEED FOR VACCINATION: Primary | ICD-10-CM

## 2021-04-16 PROCEDURE — 0002A SARSCOV2 VAC 30MCG/0.3ML IM: CPT

## 2021-07-21 DIAGNOSIS — E78.5 DYSLIPIDEMIA: ICD-10-CM

## 2021-07-21 RX ORDER — ROSUVASTATIN CALCIUM 5 MG/1
5 TABLET, COATED ORAL NIGHTLY
Qty: 90 TABLET | Refills: 0 | Status: SHIPPED | OUTPATIENT
Start: 2021-07-21 | End: 2021-10-23

## 2021-07-21 RX ORDER — METFORMIN HYDROCHLORIDE 500 MG/1
500 TABLET, EXTENDED RELEASE ORAL DAILY
Qty: 90 TABLET | Refills: 0 | Status: SHIPPED | OUTPATIENT
Start: 2021-07-21 | End: 2021-10-25

## 2021-07-21 NOTE — TELEPHONE ENCOUNTER
PASSED per protocol, refill sent.   Last PE 3.12.21  Future Appointments   Date Time Provider [facility] Judi   7/26/2021 10:00 AM Tevin Rincon PT IR[facility] PT [facility]   8/6/2021  9:15 AM Axel Horowitz MD [facility]  SPAL

## 2021-10-20 ENCOUNTER — TELEPHONE (OUTPATIENT)
Dept: INTERNAL MEDICINE CLINIC | Facility: CLINIC | Age: 56
End: 2021-10-20

## 2021-10-20 DIAGNOSIS — E78.5 DYSLIPIDEMIA: ICD-10-CM

## 2021-10-23 ENCOUNTER — IMMUNIZATION (OUTPATIENT)
Dept: LAB | Facility: HOSPITAL | Age: 56
End: 2021-10-23
Attending: EMERGENCY MEDICINE
Payer: COMMERCIAL

## 2021-10-23 DIAGNOSIS — Z23 NEED FOR VACCINATION: Primary | ICD-10-CM

## 2021-10-23 PROCEDURE — 0003A SARSCOV2 VAC 30MCG/0.3ML IM: CPT

## 2021-10-23 RX ORDER — ROSUVASTATIN CALCIUM 5 MG/1
TABLET, COATED ORAL
Qty: 90 TABLET | Refills: 0 | Status: SHIPPED | OUTPATIENT
Start: 2021-10-23 | End: 2022-01-18

## 2021-10-23 NOTE — TELEPHONE ENCOUNTER
Been Following AS  Last OV 3/12/21  Last CPE 3/12/21  Last Labs CBC, CM, Lipid, PSA, TSH w Ref, A1c, Microalb/creat 3/5/21    Last Rx fill Metformin ER 500mg #90 0R 7/21/21    No future appointments. Per PROTOCOL metformin failed.  Appt/A1c due, last A1c

## 2021-10-25 RX ORDER — METFORMIN HYDROCHLORIDE 500 MG/1
TABLET, EXTENDED RELEASE ORAL
Qty: 90 TABLET | Refills: 0 | Status: SHIPPED | OUTPATIENT
Start: 2021-10-25 | End: 2022-01-19

## 2022-01-18 DIAGNOSIS — E78.5 DYSLIPIDEMIA: ICD-10-CM

## 2022-01-18 RX ORDER — ROSUVASTATIN CALCIUM 5 MG/1
TABLET, COATED ORAL
Qty: 90 TABLET | Refills: 0 | Status: SHIPPED | OUTPATIENT
Start: 2022-01-18

## 2022-01-18 NOTE — TELEPHONE ENCOUNTER
Rosuvastatin-PASSED per protocol, refill sent. Last OV 3.12.21 w/ AS (annual pe)   Last PE 3.12.21  Last REFILL 10.25.21 Metformin ER 500mg #90 0R  Last LABS 3.5.21 Microalb, HgA1c, TSH w/reflex, PSA, Lipid, CMP, CBC    No future appointments.       Per

## 2022-01-19 RX ORDER — METFORMIN HYDROCHLORIDE 500 MG/1
TABLET, EXTENDED RELEASE ORAL
Qty: 90 TABLET | Refills: 0 | Status: SHIPPED | OUTPATIENT
Start: 2022-01-19

## 2022-03-10 ENCOUNTER — TELEPHONE (OUTPATIENT)
Dept: INTERNAL MEDICINE CLINIC | Facility: CLINIC | Age: 57
End: 2022-03-10

## 2022-03-10 NOTE — TELEPHONE ENCOUNTER
Spoke to pt. Pt said that he has had this 2-3 times before, but this time it does not seem to be going away. His scrotum is swollen/painful. This has been going on for the past week. Routing to AD for fyi.

## 2022-03-10 NOTE — TELEPHONE ENCOUNTER
Spoke to pt wife, Mireille Bowers. Kaylah stated that pt is currently on a business call. Asked Kaylah to have pt call back when he is available to discuss his sx. Kaylah stated understanding and said that she will tell pt.

## 2022-03-11 ENCOUNTER — OFFICE VISIT (OUTPATIENT)
Dept: INTERNAL MEDICINE CLINIC | Facility: CLINIC | Age: 57
End: 2022-03-11
Payer: COMMERCIAL

## 2022-03-11 VITALS
RESPIRATION RATE: 16 BRPM | HEIGHT: 71 IN | BODY MASS INDEX: 31.92 KG/M2 | OXYGEN SATURATION: 99 % | HEART RATE: 68 BPM | TEMPERATURE: 98 F | DIASTOLIC BLOOD PRESSURE: 74 MMHG | WEIGHT: 228 LBS | SYSTOLIC BLOOD PRESSURE: 120 MMHG

## 2022-03-11 DIAGNOSIS — M48.061 SPINAL STENOSIS OF LUMBAR REGION WITHOUT NEUROGENIC CLAUDICATION: ICD-10-CM

## 2022-03-11 DIAGNOSIS — E66.9 CLASS 1 OBESITY: ICD-10-CM

## 2022-03-11 DIAGNOSIS — L72.9 SCROTAL CYST: Primary | ICD-10-CM

## 2022-03-11 DIAGNOSIS — M54.50 CHRONIC BILATERAL LOW BACK PAIN WITHOUT SCIATICA: ICD-10-CM

## 2022-03-11 DIAGNOSIS — G89.29 CHRONIC BILATERAL LOW BACK PAIN WITHOUT SCIATICA: ICD-10-CM

## 2022-03-11 PROCEDURE — 99214 OFFICE O/P EST MOD 30 MIN: CPT | Performed by: INTERNAL MEDICINE

## 2022-03-11 PROCEDURE — 3078F DIAST BP <80 MM HG: CPT | Performed by: INTERNAL MEDICINE

## 2022-03-11 PROCEDURE — 3074F SYST BP LT 130 MM HG: CPT | Performed by: INTERNAL MEDICINE

## 2022-03-11 PROCEDURE — 3008F BODY MASS INDEX DOCD: CPT | Performed by: INTERNAL MEDICINE

## 2022-03-11 RX ORDER — CLINDAMYCIN HYDROCHLORIDE 300 MG/1
300 CAPSULE ORAL 4 TIMES DAILY
Qty: 28 CAPSULE | Refills: 0 | Status: SHIPPED | OUTPATIENT
Start: 2022-03-11 | End: 2022-03-18

## 2022-03-14 ENCOUNTER — TELEPHONE (OUTPATIENT)
Dept: INTERNAL MEDICINE CLINIC | Facility: CLINIC | Age: 57
End: 2022-03-14

## 2022-04-19 RX ORDER — ROSUVASTATIN CALCIUM 5 MG/1
TABLET, COATED ORAL
Qty: 90 TABLET | Refills: 0 | Status: SHIPPED | OUTPATIENT
Start: 2022-04-19

## 2022-04-19 RX ORDER — METFORMIN HYDROCHLORIDE 500 MG/1
TABLET, EXTENDED RELEASE ORAL
Qty: 90 TABLET | Refills: 0 | Status: SHIPPED | OUTPATIENT
Start: 2022-04-19

## 2022-04-29 ENCOUNTER — TELEPHONE (OUTPATIENT)
Dept: INTERNAL MEDICINE CLINIC | Facility: CLINIC | Age: 57
End: 2022-04-29

## 2022-04-29 NOTE — TELEPHONE ENCOUNTER
Future Appointments   Date Time Provider Mary Judi   6/10/2022 10:30 AM Loly Hendrickson MD [facility]     Annual Physical   Lab is [facility]  Pt aware to fast and to complete labs no sooner than 2 weeks prior to physical   No call back required

## 2022-05-14 ENCOUNTER — IMMUNIZATION (OUTPATIENT)
Dept: LAB | Age: 57
End: 2022-05-14
Attending: EMERGENCY MEDICINE
Payer: COMMERCIAL

## 2022-05-14 DIAGNOSIS — Z23 NEED FOR VACCINATION: Primary | ICD-10-CM

## 2022-05-14 PROCEDURE — 0054A SARSCOV2 VAC 30MCG TRS SUCR: CPT

## 2022-05-18 ENCOUNTER — LAB ENCOUNTER (OUTPATIENT)
Dept: LAB | Facility: HOSPITAL | Age: 57
End: 2022-05-18
Attending: INTERNAL MEDICINE
Payer: COMMERCIAL

## 2022-05-18 DIAGNOSIS — Z13.29 SCREENING FOR THYROID DISORDER: ICD-10-CM

## 2022-05-18 DIAGNOSIS — Z12.5 SCREENING FOR PROSTATE CANCER: ICD-10-CM

## 2022-05-18 DIAGNOSIS — Z13.29 SCREENING FOR ENDOCRINE, NUTRITIONAL, METABOLIC AND IMMUNITY DISORDER: ICD-10-CM

## 2022-05-18 DIAGNOSIS — Z13.0 SCREENING FOR ENDOCRINE, NUTRITIONAL, METABOLIC AND IMMUNITY DISORDER: ICD-10-CM

## 2022-05-18 DIAGNOSIS — Z00.00 LABORATORY EXAM ORDERED AS PART OF ROUTINE GENERAL MEDICAL EXAMINATION: ICD-10-CM

## 2022-05-18 DIAGNOSIS — Z13.21 SCREENING FOR ENDOCRINE, NUTRITIONAL, METABOLIC AND IMMUNITY DISORDER: ICD-10-CM

## 2022-05-18 DIAGNOSIS — Z13.228 SCREENING FOR ENDOCRINE, NUTRITIONAL, METABOLIC AND IMMUNITY DISORDER: ICD-10-CM

## 2022-05-18 DIAGNOSIS — E78.5 DYSLIPIDEMIA: ICD-10-CM

## 2022-05-18 DIAGNOSIS — E11.9 TYPE 2 DIABETES MELLITUS WITHOUT COMPLICATION, WITHOUT LONG-TERM CURRENT USE OF INSULIN (HCC): ICD-10-CM

## 2022-05-18 LAB
ALBUMIN SERPL-MCNC: 3.5 G/DL (ref 3.4–5)
ALBUMIN/GLOB SERPL: 0.9 {RATIO} (ref 1–2)
ALP LIVER SERPL-CCNC: 45 U/L
ALT SERPL-CCNC: 28 U/L
ANION GAP SERPL CALC-SCNC: 3 MMOL/L (ref 0–18)
AST SERPL-CCNC: 8 U/L (ref 15–37)
BASOPHILS # BLD AUTO: 0.06 X10(3) UL (ref 0–0.2)
BASOPHILS NFR BLD AUTO: 0.7 %
BILIRUB SERPL-MCNC: 0.4 MG/DL (ref 0.1–2)
BUN BLD-MCNC: 14 MG/DL (ref 7–18)
CALCIUM BLD-MCNC: 8.9 MG/DL (ref 8.5–10.1)
CHLORIDE SERPL-SCNC: 111 MMOL/L (ref 98–112)
CHOLEST SERPL-MCNC: 159 MG/DL (ref ?–200)
CO2 SERPL-SCNC: 27 MMOL/L (ref 21–32)
COMPLEXED PSA SERPL-MCNC: 3.89 NG/ML (ref ?–4)
CREAT BLD-MCNC: 1.07 MG/DL
CREAT UR-SCNC: 241 MG/DL
EOSINOPHIL # BLD AUTO: 0.27 X10(3) UL (ref 0–0.7)
EOSINOPHIL NFR BLD AUTO: 3.3 %
ERYTHROCYTE [DISTWIDTH] IN BLOOD BY AUTOMATED COUNT: 12.5 %
EST. AVERAGE GLUCOSE BLD GHB EST-MCNC: 123 MG/DL (ref 68–126)
FASTING PATIENT LIPID ANSWER: YES
FASTING STATUS PATIENT QL REPORTED: YES
GLOBULIN PLAS-MCNC: 3.7 G/DL (ref 2.8–4.4)
GLUCOSE BLD-MCNC: 102 MG/DL (ref 70–99)
HBA1C MFR BLD: 5.9 % (ref ?–5.7)
HCT VFR BLD AUTO: 45.5 %
HDLC SERPL-MCNC: 39 MG/DL (ref 40–59)
HGB BLD-MCNC: 14.5 G/DL
IMM GRANULOCYTES # BLD AUTO: 0.02 X10(3) UL (ref 0–1)
IMM GRANULOCYTES NFR BLD: 0.2 %
LDLC SERPL CALC-MCNC: 92 MG/DL (ref ?–100)
LYMPHOCYTES # BLD AUTO: 2.43 X10(3) UL (ref 1–4)
LYMPHOCYTES NFR BLD AUTO: 29.6 %
MCH RBC QN AUTO: 30.4 PG (ref 26–34)
MCHC RBC AUTO-ENTMCNC: 31.9 G/DL (ref 31–37)
MCV RBC AUTO: 95.4 FL
MICROALBUMIN UR-MCNC: 3.13 MG/DL
MICROALBUMIN/CREAT 24H UR-RTO: 13 UG/MG (ref ?–30)
MONOCYTES # BLD AUTO: 0.62 X10(3) UL (ref 0.1–1)
MONOCYTES NFR BLD AUTO: 7.6 %
NEUTROPHILS # BLD AUTO: 4.81 X10 (3) UL (ref 1.5–7.7)
NEUTROPHILS # BLD AUTO: 4.81 X10(3) UL (ref 1.5–7.7)
NEUTROPHILS NFR BLD AUTO: 58.6 %
NONHDLC SERPL-MCNC: 120 MG/DL (ref ?–130)
OSMOLALITY SERPL CALC.SUM OF ELEC: 293 MOSM/KG (ref 275–295)
PLATELET # BLD AUTO: 200 10(3)UL (ref 150–450)
POTASSIUM SERPL-SCNC: 4.1 MMOL/L (ref 3.5–5.1)
PROT SERPL-MCNC: 7.2 G/DL (ref 6.4–8.2)
RBC # BLD AUTO: 4.77 X10(6)UL
SODIUM SERPL-SCNC: 141 MMOL/L (ref 136–145)
TRIGL SERPL-MCNC: 162 MG/DL (ref 30–149)
TSI SER-ACNC: 1.99 MIU/ML (ref 0.36–3.74)
VLDLC SERPL CALC-MCNC: 26 MG/DL (ref 0–30)
WBC # BLD AUTO: 8.2 X10(3) UL (ref 4–11)

## 2022-05-18 PROCEDURE — 80053 COMPREHEN METABOLIC PANEL: CPT

## 2022-05-18 PROCEDURE — 84443 ASSAY THYROID STIM HORMONE: CPT

## 2022-05-18 PROCEDURE — 83036 HEMOGLOBIN GLYCOSYLATED A1C: CPT

## 2022-05-18 PROCEDURE — 36415 COLL VENOUS BLD VENIPUNCTURE: CPT

## 2022-05-18 PROCEDURE — 80061 LIPID PANEL: CPT

## 2022-05-18 PROCEDURE — 82043 UR ALBUMIN QUANTITATIVE: CPT

## 2022-05-18 PROCEDURE — 82570 ASSAY OF URINE CREATININE: CPT

## 2022-05-18 PROCEDURE — 85025 COMPLETE CBC W/AUTO DIFF WBC: CPT

## 2022-06-10 ENCOUNTER — OFFICE VISIT (OUTPATIENT)
Dept: INTERNAL MEDICINE CLINIC | Facility: CLINIC | Age: 57
End: 2022-06-10
Payer: COMMERCIAL

## 2022-06-10 VITALS
HEART RATE: 62 BPM | DIASTOLIC BLOOD PRESSURE: 66 MMHG | TEMPERATURE: 97 F | BODY MASS INDEX: 30.94 KG/M2 | SYSTOLIC BLOOD PRESSURE: 116 MMHG | WEIGHT: 221 LBS | HEIGHT: 70.75 IN | OXYGEN SATURATION: 98 %

## 2022-06-10 DIAGNOSIS — E11.9 TYPE 2 DIABETES MELLITUS WITHOUT COMPLICATION, WITHOUT LONG-TERM CURRENT USE OF INSULIN (HCC): ICD-10-CM

## 2022-06-10 DIAGNOSIS — M54.16 LUMBAR RADICULOPATHY: ICD-10-CM

## 2022-06-10 DIAGNOSIS — R73.03 PRE-DIABETES: ICD-10-CM

## 2022-06-10 DIAGNOSIS — Z00.00 PE (PHYSICAL EXAM), ANNUAL: Primary | ICD-10-CM

## 2022-06-10 DIAGNOSIS — E78.5 DYSLIPIDEMIA: ICD-10-CM

## 2022-06-10 RX ORDER — METHYLPREDNISOLONE 4 MG/1
TABLET ORAL
Qty: 1 EACH | Refills: 0 | Status: SHIPPED | OUTPATIENT
Start: 2022-06-10

## 2022-06-10 RX ORDER — ROSUVASTATIN CALCIUM 5 MG/1
5 TABLET, COATED ORAL NIGHTLY
Qty: 90 TABLET | Refills: 3 | Status: SHIPPED | OUTPATIENT
Start: 2022-06-10

## 2022-06-10 RX ORDER — METFORMIN HYDROCHLORIDE 500 MG/1
500 TABLET, EXTENDED RELEASE ORAL DAILY
Qty: 90 TABLET | Refills: 3 | Status: SHIPPED | OUTPATIENT
Start: 2022-06-10

## 2022-06-17 ENCOUNTER — OFFICE VISIT (OUTPATIENT)
Dept: INTERNAL MEDICINE CLINIC | Facility: CLINIC | Age: 57
End: 2022-06-17
Payer: COMMERCIAL

## 2022-06-17 ENCOUNTER — LAB ENCOUNTER (OUTPATIENT)
Dept: LAB | Age: 57
End: 2022-06-17
Attending: PHYSICIAN ASSISTANT
Payer: COMMERCIAL

## 2022-06-17 ENCOUNTER — HOSPITAL ENCOUNTER (OUTPATIENT)
Dept: GENERAL RADIOLOGY | Age: 57
Discharge: HOME | End: 2022-06-17
Attending: PHYSICIAN ASSISTANT
Payer: COMMERCIAL

## 2022-06-17 VITALS
HEART RATE: 73 BPM | BODY MASS INDEX: 31.22 KG/M2 | DIASTOLIC BLOOD PRESSURE: 68 MMHG | RESPIRATION RATE: 18 BRPM | SYSTOLIC BLOOD PRESSURE: 104 MMHG | HEIGHT: 70.75 IN | WEIGHT: 223 LBS

## 2022-06-17 DIAGNOSIS — M54.16 LUMBAR RADICULOPATHY: ICD-10-CM

## 2022-06-17 DIAGNOSIS — Z01.818 PREOP EXAMINATION: ICD-10-CM

## 2022-06-17 DIAGNOSIS — Z01.818 PREOP EXAMINATION: Primary | ICD-10-CM

## 2022-06-17 LAB
APTT PPP: 27.2 SECONDS (ref 23.3–35.6)
BILIRUB UR QL STRIP.AUTO: NEGATIVE
CLARITY UR REFRACT.AUTO: CLEAR
COLOR UR AUTO: YELLOW
GLUCOSE UR STRIP.AUTO-MCNC: NEGATIVE MG/DL
INR BLD: 0.93 (ref 0.8–1.2)
KETONES UR STRIP.AUTO-MCNC: NEGATIVE MG/DL
LEUKOCYTE ESTERASE UR QL STRIP.AUTO: NEGATIVE
NITRITE UR QL STRIP.AUTO: NEGATIVE
PH UR STRIP.AUTO: 5.5 [PH] (ref 5–8)
PROT UR STRIP.AUTO-MCNC: NEGATIVE MG/DL
PROTHROMBIN TIME: 12.4 SECONDS (ref 11.6–14.8)
RBC UR QL AUTO: NEGATIVE
SP GR UR STRIP.AUTO: >=1.03 (ref 1–1.03)
UROBILINOGEN UR STRIP.AUTO-MCNC: 0.2 MG/DL

## 2022-06-17 PROCEDURE — 99244 OFF/OP CNSLTJ NEW/EST MOD 40: CPT | Performed by: PHYSICIAN ASSISTANT

## 2022-06-17 PROCEDURE — 3008F BODY MASS INDEX DOCD: CPT | Performed by: PHYSICIAN ASSISTANT

## 2022-06-17 PROCEDURE — 81003 URINALYSIS AUTO W/O SCOPE: CPT | Performed by: PHYSICIAN ASSISTANT

## 2022-06-17 PROCEDURE — 85730 THROMBOPLASTIN TIME PARTIAL: CPT | Performed by: PHYSICIAN ASSISTANT

## 2022-06-17 PROCEDURE — 93000 ELECTROCARDIOGRAM COMPLETE: CPT | Performed by: PHYSICIAN ASSISTANT

## 2022-06-17 PROCEDURE — 3078F DIAST BP <80 MM HG: CPT | Performed by: PHYSICIAN ASSISTANT

## 2022-06-17 PROCEDURE — 71046 X-RAY EXAM CHEST 2 VIEWS: CPT | Performed by: PHYSICIAN ASSISTANT

## 2022-06-17 PROCEDURE — 85610 PROTHROMBIN TIME: CPT | Performed by: PHYSICIAN ASSISTANT

## 2022-06-17 PROCEDURE — 3074F SYST BP LT 130 MM HG: CPT | Performed by: PHYSICIAN ASSISTANT

## 2022-06-21 ENCOUNTER — OFFICE VISIT (OUTPATIENT)
Dept: SURGERY | Facility: CLINIC | Age: 57
End: 2022-06-21
Payer: COMMERCIAL

## 2022-06-21 ENCOUNTER — TELEPHONE (OUTPATIENT)
Dept: INTERNAL MEDICINE CLINIC | Facility: CLINIC | Age: 57
End: 2022-06-21

## 2022-06-21 VITALS — HEART RATE: 80 BPM | DIASTOLIC BLOOD PRESSURE: 80 MMHG | SYSTOLIC BLOOD PRESSURE: 130 MMHG

## 2022-06-21 DIAGNOSIS — M54.16 LUMBAR RADICULOPATHY: Primary | ICD-10-CM

## 2022-06-21 PROCEDURE — 99214 OFFICE O/P EST MOD 30 MIN: CPT | Performed by: PHYSICIAN ASSISTANT

## 2022-06-21 PROCEDURE — 3075F SYST BP GE 130 - 139MM HG: CPT | Performed by: PHYSICIAN ASSISTANT

## 2022-06-21 PROCEDURE — 3079F DIAST BP 80-89 MM HG: CPT | Performed by: PHYSICIAN ASSISTANT

## 2022-06-21 NOTE — TELEPHONE ENCOUNTER
Pt is having surgery tomorrow-spine center requesting lab and EKG results be faxed to them today at [phone]

## 2022-06-21 NOTE — PROGRESS NOTES
States he's having back surgery tomorrow with Dr. Jermaine Goins, wanted to go over his imaging and right leg is weak.

## 2022-08-10 DIAGNOSIS — E11.9 TYPE 2 DIABETES MELLITUS WITHOUT COMPLICATION, WITHOUT LONG-TERM CURRENT USE OF INSULIN (HCC): ICD-10-CM

## 2022-08-10 DIAGNOSIS — E78.5 DYSLIPIDEMIA: ICD-10-CM

## 2022-08-10 RX ORDER — METFORMIN HYDROCHLORIDE 500 MG/1
500 TABLET, EXTENDED RELEASE ORAL DAILY
Qty: 90 TABLET | Refills: 3 | OUTPATIENT
Start: 2022-08-10

## 2022-08-10 RX ORDER — ROSUVASTATIN CALCIUM 5 MG/1
5 TABLET, COATED ORAL NIGHTLY
Qty: 90 TABLET | Refills: 3 | OUTPATIENT
Start: 2022-08-10

## 2022-11-18 DIAGNOSIS — E11.9 TYPE 2 DIABETES MELLITUS WITHOUT COMPLICATION, WITHOUT LONG-TERM CURRENT USE OF INSULIN (HCC): ICD-10-CM

## 2022-11-18 DIAGNOSIS — E78.5 DYSLIPIDEMIA: ICD-10-CM

## 2022-11-21 RX ORDER — ROSUVASTATIN CALCIUM 5 MG/1
5 TABLET, COATED ORAL NIGHTLY
Qty: 90 TABLET | Refills: 3 | OUTPATIENT
Start: 2022-11-21

## 2022-11-21 RX ORDER — METFORMIN HYDROCHLORIDE 500 MG/1
500 TABLET, EXTENDED RELEASE ORAL DAILY
Qty: 90 TABLET | Refills: 3 | OUTPATIENT
Start: 2022-11-21

## 2022-12-14 ENCOUNTER — PATIENT MESSAGE (OUTPATIENT)
Dept: FAMILY MEDICINE CLINIC | Facility: CLINIC | Age: 57
End: 2022-12-14

## 2023-08-11 DIAGNOSIS — E11.9 TYPE 2 DIABETES MELLITUS WITHOUT COMPLICATION, WITHOUT LONG-TERM CURRENT USE OF INSULIN (HCC): ICD-10-CM

## 2023-08-11 DIAGNOSIS — E78.5 DYSLIPIDEMIA: ICD-10-CM

## 2023-08-11 NOTE — TELEPHONE ENCOUNTER
Requested Prescriptions     Pending Prescriptions Disp Refills    METFORMIN  MG Oral Tablet 24 Hr [Pharmacy Med Name: METFORMIN ER 500MG 24HR TABS] 90 tablet 3     Sig: TAKE 1 TABLET(500 MG) BY MOUTH DAILY    ROSUVASTATIN 5 MG Oral Tab [Pharmacy Med Name: ROSUVASTATIN 5MG TABLETS] 90 tablet 3     Sig: TAKE 1 TABLET(5 MG) BY MOUTH EVERY NIGHT       LOV: 6/17/22    LAST PHYSICAL: 6/10/22    LAST REFILL: metformin-90-6/10/22  Rosuvastatin-90-6/10/22    LAST LAB: 5/18/22

## 2023-08-13 RX ORDER — ROSUVASTATIN CALCIUM 5 MG/1
5 TABLET, COATED ORAL NIGHTLY
Qty: 90 TABLET | Refills: 3 | Status: SHIPPED | OUTPATIENT
Start: 2023-08-13

## 2023-08-13 RX ORDER — METFORMIN HYDROCHLORIDE 500 MG/1
500 TABLET, EXTENDED RELEASE ORAL DAILY
Qty: 90 TABLET | Refills: 3 | Status: SHIPPED | OUTPATIENT
Start: 2023-08-13

## 2024-01-22 DIAGNOSIS — I25.10 LAD STENOSIS: Primary | ICD-10-CM

## 2024-08-29 ENCOUNTER — TELEPHONE (OUTPATIENT)
Dept: INTERNAL MEDICINE CLINIC | Facility: CLINIC | Age: 59
End: 2024-08-29

## 2024-08-29 DIAGNOSIS — E11.9 TYPE 2 DIABETES MELLITUS WITHOUT COMPLICATION, WITHOUT LONG-TERM CURRENT USE OF INSULIN (HCC): ICD-10-CM

## 2024-08-30 RX ORDER — METFORMIN HCL 500 MG
500 TABLET, EXTENDED RELEASE 24 HR ORAL DAILY
Qty: 90 TABLET | Refills: 3 | OUTPATIENT
Start: 2024-08-30

## 2024-08-30 NOTE — TELEPHONE ENCOUNTER
Please call patient to assist in making an appointment. Thank you      Last office visit: 6/17/2022    A [employer] message has been sent to patient

## 2024-09-03 NOTE — TELEPHONE ENCOUNTER
I called patient to make appointment but he states he has new pcp thru [facility]    Please remove Dr. Adam Schriedel as pcp

## 2024-10-10 ENCOUNTER — PATIENT OUTREACH (OUTPATIENT)
Dept: CASE MANAGEMENT | Age: 59
End: 2024-10-10

## 2024-10-10 NOTE — PROCEDURES
The office order for PCP removal request is Approved and finalized on October 10, 2024.    Removed Adam Schriedel, MD as the patient's Primary Care Physician

## 2025-02-25 RX ORDER — ASPIRIN 81 MG/1
81 TABLET ORAL DAILY
COMMUNITY

## 2025-02-25 RX ORDER — ROSUVASTATIN CALCIUM 10 MG/1
10 TABLET, COATED ORAL NIGHTLY
COMMUNITY

## 2025-02-25 NOTE — PAT NURSING NOTE
Per PAT encounter/MyChart message sent to pt/took notes as well:    PreOp Instructions     You are scheduled for: a Cardiac Procedure     Date of Procedure: 03/05/25 Wednesday     Diet Instructions: Do not eat or drink anything after midnight including gum, mints, candy, etc.     Medications: Medications you are allowed to take can be taken with a sip of water the morning of your procedure, Take Aspirin 81 mg x 4 tablets the day of your procedure     Medications to Stop: Hold herbal supplements and vitamins    Diabetic Instructions: Metformin needs to be held prior to procedure, your last dose should Monday evening 3/3.     Skin Prep : Shower with antibacterial soap using a clean washcloth, prior to procedure. Once dried off, no lotions/powders/creams/ointments, etc., Do not shave the procedure area, this will be completed at the hospital during the preparation phase.     Arrival Time: The day prior to your procedure (Tuesday) you will receive a phone call before 6:00 pm with your arrival time. If you haven't received a phone call, please check your voicemail messages., If you did not receive a voice mail and it is after 6:00 pm, please call the nursing supervisor at [phone].    Driving After Procedure: Sedation will be given so you WILL NOT be able to drive home. You will need a responsible adult  to drive you home. You can NOT take uber or taxi unless approved by your physician in advance.     Discharge Teaching: Your nurse will give you specific instructions before discharge, Most people can resume normal activities in 2-3 days, Any questions, please call the physician's office      parking is available starting at 6 am or park in the [place] parking garage at [facility]. Check in at the [facility] reception desk. Our  will be there to check you in for your procedure. Please bring your insurance cards and ID with you.                                                                                                                                       Please DO NOT respond to this message, the inbasket is not monitored for messages. For any questions, please call the physician's office.

## 2025-03-05 ENCOUNTER — HOSPITAL ENCOUNTER (OUTPATIENT)
Dept: INTERVENTIONAL RADIOLOGY/VASCULAR | Facility: HOSPITAL | Age: 60
Discharge: HOME | End: 2025-03-05
Attending: INTERNAL MEDICINE | Admitting: INTERNAL MEDICINE
Payer: COMMERCIAL

## 2025-03-05 VITALS
SYSTOLIC BLOOD PRESSURE: 124 MMHG | HEIGHT: 71 IN | BODY MASS INDEX: 32.9 KG/M2 | OXYGEN SATURATION: 94 % | DIASTOLIC BLOOD PRESSURE: 80 MMHG | TEMPERATURE: 98 F | RESPIRATION RATE: 23 BRPM | WEIGHT: 235 LBS | HEART RATE: 87 BPM

## 2025-03-05 DIAGNOSIS — R93.1 ABNORMAL FINDINGS DIAGNOSTIC IMAGING OF HEART AND CORONARY CIRCULATION: ICD-10-CM

## 2025-03-05 PROCEDURE — 99152 MOD SED SAME PHYS/QHP 5/>YRS: CPT | Performed by: INTERNAL MEDICINE

## 2025-03-05 PROCEDURE — 4A023N7 MEASUREMENT OF CARDIAC SAMPLING AND PRESSURE, LEFT HEART, PERCUTANEOUS APPROACH: ICD-10-PCS | Performed by: INTERNAL MEDICINE

## 2025-03-05 PROCEDURE — B2111ZZ FLUOROSCOPY OF MULTIPLE CORONARY ARTERIES USING LOW OSMOLAR CONTRAST: ICD-10-PCS | Performed by: INTERNAL MEDICINE

## 2025-03-05 PROCEDURE — 93458 L HRT ARTERY/VENTRICLE ANGIO: CPT | Performed by: INTERNAL MEDICINE

## 2025-03-05 PROCEDURE — 93799 UNLISTED CV SVC/PROCEDURE: CPT | Performed by: INTERNAL MEDICINE

## 2025-03-05 PROCEDURE — 99153 MOD SED SAME PHYS/QHP EA: CPT | Performed by: INTERNAL MEDICINE

## 2025-03-05 PROCEDURE — B2151ZZ FLUOROSCOPY OF LEFT HEART USING LOW OSMOLAR CONTRAST: ICD-10-PCS | Performed by: INTERNAL MEDICINE

## 2025-03-05 RX ORDER — VERAPAMIL HYDROCHLORIDE 2.5 MG/ML
INJECTION, SOLUTION INTRAVENOUS
Status: COMPLETED
Start: 2025-03-05 | End: 2025-03-05

## 2025-03-05 RX ORDER — LIDOCAINE HYDROCHLORIDE 10 MG/ML
INJECTION, SOLUTION EPIDURAL; INFILTRATION; INTRACAUDAL; PERINEURAL
Status: COMPLETED
Start: 2025-03-05 | End: 2025-03-05

## 2025-03-05 RX ORDER — IOPAMIDOL 755 MG/ML
100 INJECTION, SOLUTION INTRAVASCULAR
Status: COMPLETED | OUTPATIENT
Start: 2025-03-05 | End: 2025-03-05

## 2025-03-05 RX ORDER — MIDAZOLAM HYDROCHLORIDE 1 MG/ML
INJECTION INTRAMUSCULAR; INTRAVENOUS
Status: COMPLETED
Start: 2025-03-05 | End: 2025-03-05

## 2025-03-05 RX ORDER — SODIUM CHLORIDE 9 MG/ML
INJECTION, SOLUTION INTRAVENOUS
Status: DISCONTINUED | OUTPATIENT
Start: 2025-03-06 | End: 2025-03-05

## 2025-03-05 RX ORDER — NITROGLYCERIN 20 MG/100ML
INJECTION INTRAVENOUS
Status: COMPLETED
Start: 2025-03-05 | End: 2025-03-05

## 2025-03-05 RX ORDER — HEPARIN SODIUM 5000 [USP'U]/ML
INJECTION, SOLUTION INTRAVENOUS; SUBCUTANEOUS
Status: COMPLETED
Start: 2025-03-05 | End: 2025-03-05

## 2025-03-05 RX ADMIN — IOPAMIDOL 90 ML: 755 INJECTION, SOLUTION INTRAVASCULAR at 07:50:00

## 2025-03-05 NOTE — PLAN OF CARE
Patient had C today with Dr. Lentz. Right wrist access site with TR band in place with 10cc air instilled. Site is CDI. Patient denies any numbness or tingling to right hand/fingers. Patient has good O2 pleth on right hand. VSS. Patient denies any pain. Patient's wife @ bedside. Dr. Lentz @ bedside. Patient tolerating po intake. Dr. Horn @ bedside. Air intermittently released from TR band until all air removed. TR band removed. Site is soft, CDI, +2 radial pulse. Occlusive dressing applied. Patient completed recovery time. Discharge instructions reviewed. IV D/C'd. Patient discharged to [facility] by wheelchair with belongings. Wife is .

## 2025-03-05 NOTE — H&P
Patient seen and examined independently. H and P by Dr. Dye dated 2/19/25 reviewed. No changes in H and P. Risks and benefits of procedure were discussed with patient. Airway examined.  Patient is ASA class 2 and mallampati class 2. Pt is appropriate for conscious sedation. No history of difficult airway.    The risks, benefits, and alternatives of cardiac catheterization were discussed. The risks included, but were not limited to: bleeding, allergic reaction, infection, stroke, myocardial infarction (heart attack), and death. Benefits of the procedure included: symptomatic improvement, diagnosis of heart disease and prevention of myocardial infarction. Alternatives to the procedure included: not performing cardiac catheterization, treatment with medications only, and observation.        Appropriate candidate for Sedation/Analgesia: Yes  Plan for Sedation reviewed: Yes    Explained Anesthesia options and attendant risks, and have determined patient is an appropriate candidate. Yes  Consent for Sedation obtained: Yes  Patient reevaluated immediately prior to Sedation/Analgesia: Yes

## 2025-03-05 NOTE — PROCEDURES
[facility] Location: Cath Lab    CSN [id number] MRN [id number]   Admission Date 3/5/2025 Procedure Date 3/5/2025   Attending Physician Javy Dye MD Procedure Physician Marquez Lentz MD         CARDIAC CATHETERIZATION REPORT     PREOPERATIVE DIAGNOSIS:  epigastric discomfort, hx of DM2; abnormal coronary CTA suggestive of hemodynamically obstructive disease in the LAD/diagonal territory  POSTOPERATIVE DIAGNOSIS:  multi-vessel coronary artery disease- LAD and RCA.  PROCEDURE PERFORMED:  left heart catheterization, left ventriculogram, selective coronary angiography, IFR hemodynamic testing to the LCx/OM territory      PROCEDURE:  The patient was brought to the cardiac catheterization lab in the fasting state.  Informed consent was obtained.  Moderate sedation was employed using a total of IV Versed 3mg and IV fentanyl 75mcg.  I directly observed the patient from 0712 to 0745, for a total of 33 minutes, and an independent trained observer was present and assisted in the monitoring of the patient's level of consciousness and physiological status, watching the heart rate, blood pressure, oximetry, and rhythm, in addition to total moderation time.      ACCESS/CATHETER PLACEMENT:   The right radial area was prepped and draped in a sterile manner and anesthetized with 2% lidocaine.  The right radial artery was accessed, and a 6-Kosovan, 11 cm sheath was placed.  Left and right selective coronary angiography was performed using a 6F Tiger4.0 catheter.  A pigtail catheter was used to cross the aortic valve, measure left ventricular pressure and perform a 30 degree NORTON ventriculogram.  The catheter was pulled across the valve to assess for aortic stenosis.  At the conclusion of the study, the right radial artery hemostasis was performed using a radial band.         FINDINGS:      1.  Left heart catheterization:    Left ventricle: 99/14 mmHg  Aorta: 96/69/81 mmHg  Left ventriculogram demonstrated a LV  ejection fraction of 55% without significant mitral regurgitation; there are no regional wall motion abnormalities.  There was no aortic stenosis upon pullback of the catheter.    2.  Selective coronary angiography:      Left main artery: The left main artery is a medium size bifurcating vessel.  There is a proximal bend in the artery that tapers to half diameter in the mid/distal segment.    LAD:  The left anterior descending artery is a medium size vessel that wraps around the apex and gives rise to a mid diagonal branch.  The proximal/mid LAD is diffusely calcified with an 80% stenosis.  Additionally, the vessel is tortuous in that segment, which also involves the diagonal takeoff.      LCx: The left circumflex artery is a medium size vessel that gives rise to a high obtuse marginal and a smaller mid obtuse marginal.  The ostial LCx is mildly narrowed, approximately 20-30%. Otherwise, mild luminal irregularities present.     RCA:  The right coronary artery is a medium size, dominant vessel that gives rise to a medium rPDA.  There is a mid 90% rPDA stenosis present.    INTERVENTIONAL PROCEDURE: Decision was made to determine if there was hemodynamically significant disease from the LM artery into the LCx/OM distribution. Heparin was used for systemic anticoagulation.  The LM artery was engaged with a 6F EBU3.5 guide catheter; a Frida Omniwire was normalized in the aorta and iFR testing was performed to the LCx and proximal OM branches; both measurements = 0.93, signifying a hemodynamically insignificant stenosis.  Post iFR angiography was unchanged from diagnostic appearance.       MEDICATIONS:  See nursing record.     COMPLICATIONS:  No major complications were observed during this visit to the catheterization lab.     IMPRESSION:    1.  Left heart catheterization: LVEDP 14mmHg, no aortic stenosis.  LVEF 55% with normal wall motion, no significant mitral regurgitation  2.  Coronary angiography:  right dominant  system   - LM: 50% mid-distal tapering  - LAD: 80% calcified/tortuous mid bifurcation stenosis .    - LCx: 30% ostial narrowing  - RCA: 90% rPDA stenosis  3. iFR testing to the LCx/OM = 0.93, signifying a non-hemodynamically significant narrowing.      RECOMMENDATIONS: Complex LAD/diagonal and rPDA disease in setting of DM2.  Will refer to CV surgery to discuss surgical revascularization options.

## 2025-03-07 RX ORDER — CALCIUM CARBONATE 500 MG/1
2 TABLET, CHEWABLE ORAL 2 TIMES DAILY PRN
COMMUNITY

## 2025-03-10 ENCOUNTER — HOSPITAL ENCOUNTER (OUTPATIENT)
Dept: GENERAL RADIOLOGY | Facility: HOSPITAL | Age: 60
Discharge: HOME | End: 2025-03-10
Attending: THORACIC SURGERY (CARDIOTHORACIC VASCULAR SURGERY)
Payer: COMMERCIAL

## 2025-03-10 ENCOUNTER — HOSPITAL ENCOUNTER (OUTPATIENT)
Dept: CV DIAGNOSTICS | Facility: HOSPITAL | Age: 60
Discharge: HOME | End: 2025-03-10
Attending: THORACIC SURGERY (CARDIOTHORACIC VASCULAR SURGERY)
Payer: COMMERCIAL

## 2025-03-10 ENCOUNTER — HOSPITAL ENCOUNTER (OUTPATIENT)
Dept: INTERVENTIONAL RADIOLOGY/VASCULAR | Facility: HOSPITAL | Age: 60
Discharge: HOME | End: 2025-03-10
Attending: THORACIC SURGERY (CARDIOTHORACIC VASCULAR SURGERY)
Payer: COMMERCIAL

## 2025-03-10 ENCOUNTER — HOSPITAL ENCOUNTER (OUTPATIENT)
Dept: CARDIOLOGY CLINIC | Facility: HOSPITAL | Age: 60
Discharge: HOME | End: 2025-03-10
Attending: THORACIC SURGERY (CARDIOTHORACIC VASCULAR SURGERY)
Payer: COMMERCIAL

## 2025-03-10 ENCOUNTER — HOSPITAL ENCOUNTER (OUTPATIENT)
Dept: LAB | Facility: HOSPITAL | Age: 60
Discharge: HOME | End: 2025-03-10
Attending: THORACIC SURGERY (CARDIOTHORACIC VASCULAR SURGERY)
Payer: COMMERCIAL

## 2025-03-10 DIAGNOSIS — Z01.818 PRE-OP TESTING: ICD-10-CM

## 2025-03-10 DIAGNOSIS — I25.10 CAD (CORONARY ARTERY DISEASE), NATIVE CORONARY ARTERY: ICD-10-CM

## 2025-03-10 LAB
ALBUMIN SERPL-MCNC: 4.8 G/DL (ref 3.2–4.8)
ALP LIVER SERPL-CCNC: 43 U/L
ALT SERPL-CCNC: 35 U/L
ANTIBODY SCREEN: NEGATIVE
APTT PPP: 28.6 SECONDS (ref 23–36)
AST SERPL-CCNC: 20 U/L (ref ?–34)
BILIRUB DIRECT SERPL-MCNC: 0.2 MG/DL (ref ?–0.3)
BILIRUB SERPL-MCNC: 0.7 MG/DL (ref 0.2–1.1)
BILIRUB UR QL STRIP.AUTO: NEGATIVE
CLARITY UR REFRACT.AUTO: CLEAR
COLOR UR AUTO: YELLOW
EST. AVERAGE GLUCOSE BLD GHB EST-MCNC: 143 MG/DL (ref 68–126)
GLUCOSE UR STRIP.AUTO-MCNC: NORMAL MG/DL
HBA1C MFR BLD: 6.6 % (ref ?–5.7)
INR BLD: 0.89 (ref 0.8–1.2)
KETONES UR STRIP.AUTO-MCNC: NEGATIVE MG/DL
LEUKOCYTE ESTERASE UR QL STRIP.AUTO: NEGATIVE
NITRITE UR QL STRIP.AUTO: NEGATIVE
PH UR STRIP.AUTO: 5 [PH] (ref 5–8)
PROT SERPL-MCNC: 7.5 G/DL (ref 5.7–8.2)
PROT UR STRIP.AUTO-MCNC: 20 MG/DL
PROTHROMBIN TIME: 12.2 SECONDS (ref 11.6–14.8)
RBC UR QL AUTO: NEGATIVE
RH BLOOD TYPE: POSITIVE
SP GR UR STRIP.AUTO: 1.03 (ref 1–1.03)
UROBILINOGEN UR STRIP.AUTO-MCNC: NORMAL MG/DL

## 2025-03-10 PROCEDURE — 81001 URINALYSIS AUTO W/SCOPE: CPT | Performed by: THORACIC SURGERY (CARDIOTHORACIC VASCULAR SURGERY)

## 2025-03-10 PROCEDURE — 85730 THROMBOPLASTIN TIME PARTIAL: CPT | Performed by: THORACIC SURGERY (CARDIOTHORACIC VASCULAR SURGERY)

## 2025-03-10 PROCEDURE — 93010 ELECTROCARDIOGRAM REPORT: CPT | Performed by: INTERNAL MEDICINE

## 2025-03-10 PROCEDURE — 86901 BLOOD TYPING SEROLOGIC RH(D): CPT | Performed by: THORACIC SURGERY (CARDIOTHORACIC VASCULAR SURGERY)

## 2025-03-10 PROCEDURE — 86900 BLOOD TYPING SEROLOGIC ABO: CPT | Performed by: THORACIC SURGERY (CARDIOTHORACIC VASCULAR SURGERY)

## 2025-03-10 PROCEDURE — 83036 HEMOGLOBIN GLYCOSYLATED A1C: CPT | Performed by: THORACIC SURGERY (CARDIOTHORACIC VASCULAR SURGERY)

## 2025-03-10 PROCEDURE — 71045 X-RAY EXAM CHEST 1 VIEW: CPT | Performed by: THORACIC SURGERY (CARDIOTHORACIC VASCULAR SURGERY)

## 2025-03-10 PROCEDURE — 93005 ELECTROCARDIOGRAM TRACING: CPT

## 2025-03-10 PROCEDURE — 80076 HEPATIC FUNCTION PANEL: CPT | Performed by: THORACIC SURGERY (CARDIOTHORACIC VASCULAR SURGERY)

## 2025-03-10 PROCEDURE — 86850 RBC ANTIBODY SCREEN: CPT | Performed by: THORACIC SURGERY (CARDIOTHORACIC VASCULAR SURGERY)

## 2025-03-10 PROCEDURE — 85610 PROTHROMBIN TIME: CPT | Performed by: THORACIC SURGERY (CARDIOTHORACIC VASCULAR SURGERY)

## 2025-03-10 PROCEDURE — 36415 COLL VENOUS BLD VENIPUNCTURE: CPT | Performed by: THORACIC SURGERY (CARDIOTHORACIC VASCULAR SURGERY)

## 2025-03-10 NOTE — PAT NURSING NOTE
PAT nursing note: met with patient and spouse Kaylah in [facility] to review pre-surgical instructions for his upcoming surgery with Dr. Horn on 3/18; testing underway; reviewed binder, medication stop dates, shower schedule/instructions; npo after 2200; continue to take all prescribed medications as directed except: the morning of surgery do not take any medication; last dose of vitamins, supplements, herbal products and NSAIDs 3/10, ASA 3/12, Metformin 3/17 morning dose; denies taking blood thinners, ACEs, ARBs, or weight loss medications; no PPM, ICD, no nerve or spinal cord stimulator; admit 5-7 days; 2 visitors welcome; park in the [facility] at [facility]; register at the [facility] reception desk in the [facility] at 0530 am; keep personal possessions to a minimum: bring insurance card(s)/picture ID and binder, toiletries, wear comfortable clothing, do not wear contacts-bring glasses/eye glass case, bring denture cup/supplies; leave all valuables at home, including jewelry; discussed intra-op and post-op instructions; all questions answered; patient and spouse verbalized understanding of all instructions.      5 meter walk: 3.36, 3.42, 3.45

## 2025-03-10 NOTE — CM/SW NOTE
Met with patient, accompanied by spouse to discuss discharge planning per protocol as patient is scheduled to have CABG surgery with Dr Horn on 3/18/2025.  Gosia, resides with his spouse Kaylah in a three story home in [place] with their 28 year ols daughter, 23 year old son and their dog Azra.  The master bedroom I locate   03/10/25 1000   CM/SW Referral Data   Referral Source Physician;   Reason for Referral Discharge planning;Protocol order set   Specify order set CABG   Informant Patient;Spouse/Significant Other   Medical Hx   Does patient have an established PCP? Yes   Patient Info   Patient's Current Mental Status at Time of Assessment Alert;Oriented   Patient's Home Environment House   Number of Levels in Home 3   Patient lives with Spouse/Significant other;Son;Daughter;Other   Patient Status Prior to Admission   Independent with ADLs and Mobility Yes   Discharge Needs   Anticipated D/C needs Home health care     d on the main floor.  The couple has been  for 30 years.  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  Previously, he traveled with work, now he works from home.  The patient drives, uses readers, is independent with ADL's--currently using no assistive devices or respiratory DME.  As for interests and hobbies, he enjoys golfing, spending time with family and friends, swimming, traveling and olive picking to name just a few interests.  PCP is Dr Ida Kebede; uses [facility] to fill prescriptions.    Reviewed what to expect day of / post surgery.  Discussed how therapies to work with patient and customize a discharge plan for him.  Explained role of [facility] and how this is arranged within his insurance network--both patient/ wife in favor of this service being arranged within their insurance network.  All questions addressed and answered.  Emotional support given.  CM/SW to remain available to assist with any additional discharge needs that should  arise.

## 2025-03-10 NOTE — PROGRESS NOTES
Met with patient and wife in PAT to discuss pre op teaching, post op expectations, discharge planning.  Discussed roles of CM/SW, PT/OT, Cardiac rehab in education and recovery.  All questions answered, binder given.  Discussed typical post op and at home recovery, pt works as an , plans to take 4-6 weeks off. Pt had more technical questions regarding surgery as he was seen at the time of his angiogram, will arrange re consult with Dr. Horn, he is very appreciative, will follow up post op.  Geeta Alvares RN  Clinical Coordinator  CV Surgery

## 2025-03-12 LAB
ATRIAL RATE: 82 BPM
P AXIS: 17 DEGREES
P-R INTERVAL: 170 MS
Q-T INTERVAL: 330 MS
QRS DURATION: 92 MS
QTC CALCULATION (BEZET): 385 MS
R AXIS: -29 DEGREES
T AXIS: 25 DEGREES
VENTRICULAR RATE: 82 BPM

## 2025-03-17 ENCOUNTER — ANESTHESIA EVENT (OUTPATIENT)
Dept: CARDIAC SURGERY | Facility: HOSPITAL | Age: 60
End: 2025-03-17
Payer: COMMERCIAL

## 2025-03-17 NOTE — DISCHARGE INSTRUCTIONS
To access the Dr. Horn discharge instructions video on your home computer:   [email or web address]  Remove steri strips from all incisions on 4/1      Sometimes managing your health at home requires assistance.  The [facility] team has recognized your preference to use [facility].  They can be reached by phone at [phone].  The fax number for your reference is [phone].  A representative from the home health agency will contact you or your family to schedule your first visit.     [facility] @ dischage  [phone]

## 2025-03-18 ENCOUNTER — ANESTHESIA (OUTPATIENT)
Dept: CARDIAC SURGERY | Facility: HOSPITAL | Age: 60
End: 2025-03-18
Payer: COMMERCIAL

## 2025-03-18 ENCOUNTER — HOSPITAL ENCOUNTER (INPATIENT)
Facility: HOSPITAL | Age: 60
LOS: 4 days | Discharge: HOME HEALTH | End: 2025-03-22
Attending: THORACIC SURGERY (CARDIOTHORACIC VASCULAR SURGERY) | Admitting: THORACIC SURGERY (CARDIOTHORACIC VASCULAR SURGERY)
Payer: COMMERCIAL

## 2025-03-18 ENCOUNTER — APPOINTMENT (OUTPATIENT)
Dept: GENERAL RADIOLOGY | Facility: HOSPITAL | Age: 60
End: 2025-03-18
Attending: PHYSICIAN ASSISTANT
Payer: COMMERCIAL

## 2025-03-18 DIAGNOSIS — J90 PLEURAL EFFUSION: ICD-10-CM

## 2025-03-18 DIAGNOSIS — G89.18 POST-OP PAIN: ICD-10-CM

## 2025-03-18 DIAGNOSIS — I25.10 CAD (CORONARY ARTERY DISEASE), NATIVE CORONARY ARTERY: Primary | ICD-10-CM

## 2025-03-18 DIAGNOSIS — Z01.818 PRE-OP TESTING: ICD-10-CM

## 2025-03-18 LAB
APTT PPP: 28 SECONDS (ref 23–36)
ATRIAL RATE: 110 BPM
BASE EXCESS BLD CALC-SCNC: -5 MMOL/L
BASE EXCESS BLDA CALC-SCNC: -0.7 MMOL/L (ref ?–2)
BASE EXCESS BLDA CALC-SCNC: -3 MMOL/L (ref ?–30)
BASE EXCESS BLDV CALC-SCNC: -1 MMOL/L (ref ?–30)
BASE EXCESS BLDV CALC-SCNC: -4 MMOL/L (ref ?–30)
BASE EXCESS BLDV CALC-SCNC: 2 MMOL/L (ref ?–30)
BODY TEMPERATURE: 98.6 F
BUN BLD-MCNC: 17 MG/DL (ref 9–23)
CA-I BLD-SCNC: 1.14 MMOL/L (ref 1.12–1.32)
CA-I BLD-SCNC: 1.21 MMOL/L (ref 0.95–1.32)
CA-I BLDA-SCNC: 1.25 MMOL/L (ref 1.12–1.32)
CA-I BLDV-SCNC: 1.09 MMOL/L (ref 1.12–1.32)
CA-I BLDV-SCNC: 1.1 MMOL/L (ref 1.12–1.32)
CA-I BLDV-SCNC: 1.37 MMOL/L (ref 1.12–1.32)
CALCIUM BLD-MCNC: 8.6 MG/DL (ref 8.7–10.6)
CHLORIDE SERPL-SCNC: 107 MMOL/L (ref 98–112)
CO2 BLD-SCNC: 22 MMOL/L (ref 22–32)
CO2 BLDA-SCNC: 24 MMOL/L (ref 22–32)
CO2 BLDV-SCNC: 24 MMOL/L (ref 22–32)
CO2 BLDV-SCNC: 27 MMOL/L (ref 22–32)
CO2 BLDV-SCNC: 28 MMOL/L (ref 22–32)
CO2 SERPL-SCNC: 28 MMOL/L (ref 21–32)
COHGB MFR BLD: 1.9 % SAT (ref 0–3)
CREAT BLD-MCNC: 1.14 MG/DL
EGFRCR SERPLBLD CKD-EPI 2021: 74 ML/MIN/1.73M2 (ref 60–?)
ERYTHROCYTE [DISTWIDTH] IN BLOOD BY AUTOMATED COUNT: 12.6 %
FIBRINOGEN PPP-MCNC: 231 MG/DL (ref 200–480)
FIO2: 40 %
GLUCOSE BLD-MCNC: 103 MG/DL (ref 70–99)
GLUCOSE BLD-MCNC: 127 MG/DL (ref 70–99)
GLUCOSE BLD-MCNC: 133 MG/DL (ref 70–99)
GLUCOSE BLD-MCNC: 133 MG/DL (ref 70–99)
GLUCOSE BLD-MCNC: 138 MG/DL (ref 70–99)
GLUCOSE BLD-MCNC: 144 MG/DL (ref 70–99)
GLUCOSE BLD-MCNC: 145 MG/DL (ref 70–99)
GLUCOSE BLD-MCNC: 145 MG/DL (ref 70–99)
GLUCOSE BLD-MCNC: 149 MG/DL (ref 70–99)
GLUCOSE BLD-MCNC: 152 MG/DL (ref 70–99)
GLUCOSE BLD-MCNC: 155 MG/DL (ref 70–99)
GLUCOSE BLD-MCNC: 156 MG/DL (ref 70–99)
GLUCOSE BLDA-MCNC: 154 MG/DL (ref 70–99)
GLUCOSE BLDV-MCNC: 175 MG/DL (ref 70–99)
GLUCOSE BLDV-MCNC: 200 MG/DL (ref 70–99)
GLUCOSE BLDV-MCNC: 208 MG/DL (ref 70–99)
HCO3 BLD-SCNC: 21.1 MEQ/L
HCO3 BLDA-SCNC: 22.2 MEQ/L (ref 22–26)
HCO3 BLDA-SCNC: 23.1 MEQ/L (ref 22–26)
HCO3 BLDA-SCNC: 24.3 MEQ/L (ref 21–27)
HCO3 BLDA-SCNC: 25.4 MEQ/L (ref 22–26)
HCO3 BLDA-SCNC: 26.5 MEQ/L (ref 22–26)
HCT VFR BLD AUTO: 44.5 %
HCT VFR BLD CALC: 42 %
HCT VFR BLDA CALC: 44 %
HCT VFR BLDV CALC: 31 %
HCT VFR BLDV CALC: 32 %
HCT VFR BLDV CALC: 33 %
HGB BLD-MCNC: 15.3 G/DL
HGB BLD-MCNC: 16.1 G/DL
INR BLD: 1.12 (ref 0.8–1.2)
ISTAT ACTIVATED CLOTTING TIME: 101 SECONDS (ref 74–137)
ISTAT ACTIVATED CLOTTING TIME: 124 SECONDS (ref 74–137)
ISTAT ACTIVATED CLOTTING TIME: 325 SECONDS (ref 74–137)
ISTAT ACTIVATED CLOTTING TIME: 400 SECONDS (ref 74–137)
ISTAT ACTIVATED CLOTTING TIME: 498 SECONDS (ref 74–137)
ISTAT ACTIVATED CLOTTING TIME: 515 SECONDS (ref 74–137)
ISTAT BLOOD GAS FIO2: 60 %
ISTAT PATIENT TEMPERATURE: 32 DEGREE
ISTAT PATIENT TEMPERATURE: 32 DEGREE
LACTATE BLD-SCNC: 3.2 MMOL/L (ref 0.5–2)
MAGNESIUM SERPL-MCNC: 2.4 MG/DL (ref 1.6–2.6)
MCH RBC QN AUTO: 30.7 PG (ref 26–34)
MCHC RBC AUTO-ENTMCNC: 34.4 G/DL (ref 31–37)
MCV RBC AUTO: 89.4 FL
METHGB MFR BLD: 0.6 % SAT (ref 0.4–1.5)
OXYHGB MFR BLDA: 96.7 % (ref 92–100)
P AXIS: 52 DEGREES
P-R INTERVAL: 150 MS
PCO2 BLD: 41.2 MMHG
PCO2 BLDA: 40 MM HG (ref 35–45)
PCO2 BLDA: 46.2 MMHG (ref 35–45)
PCO2 BLDV: 34 MMHG (ref 38–50)
PCO2 BLDV: 42.7 MMHG (ref 38–50)
PCO2 BLDV: 43.7 MMHG (ref 38–50)
PEEP: 5 CM H2O
PH BLD: 7.32 [PH]
PH BLDA: 7.31 [PH] (ref 7.35–7.45)
PH BLDA: 7.39 [PH] (ref 7.35–7.45)
PH BLDV: 7.36 [PH] (ref 7.32–7.43)
PH BLDV: 7.39 [PH] (ref 7.32–7.43)
PH BLDV: 7.4 [PH] (ref 7.32–7.43)
PLATELET # BLD AUTO: 136 10(3)UL (ref 150–450)
PO2 BLD: 447 MMHG
PO2 BLDA: 119 MMHG (ref 80–105)
PO2 BLDA: 96 MM HG (ref 80–100)
PO2 BLDV: <70 MMHG (ref 35–40)
POTASSIUM BLD-SCNC: 3.9 MMOL/L (ref 3.6–5.1)
POTASSIUM BLD-SCNC: 4.7 MMOL/L (ref 3.6–5.1)
POTASSIUM SERPL-SCNC: 4.4 MMOL/L (ref 3.5–5.1)
PRESSURE SUPPORT: 5 CM H2O
PROTHROMBIN TIME: 14.6 SECONDS (ref 11.6–14.8)
Q-T INTERVAL: 342 MS
QRS DURATION: 96 MS
QTC CALCULATION (BEZET): 462 MS
R AXIS: -54 DEGREES
RBC # BLD AUTO: 4.98 X10(6)UL
RH BLOOD TYPE: POSITIVE
SAO2 % BLD: 100 %
SAO2 % BLDA: 98 % (ref 92–100)
SAO2 % BLDV: 63 % (ref 60–85)
SAO2 % BLDV: 81 % (ref 60–85)
SAO2 % BLDV: 85 % (ref 60–85)
SODIUM BLD-SCNC: 137 MMOL/L (ref 135–145)
SODIUM BLD-SCNC: 141 MMOL/L (ref 136–145)
SODIUM BLDA-SCNC: 141 MMOL/L (ref 136–145)
SODIUM BLDA-SCNC: 4.2 MMOL/L (ref 3.6–5.1)
SODIUM BLDV-SCNC: 131 MMOL/L (ref 136–145)
SODIUM BLDV-SCNC: 133 MMOL/L (ref 136–145)
SODIUM BLDV-SCNC: 135 MMOL/L (ref 136–145)
SODIUM BLDV-SCNC: 6.1 MMOL/L (ref 3.6–5.1)
SODIUM BLDV-SCNC: 6.3 MMOL/L (ref 3.6–5.1)
SODIUM BLDV-SCNC: 6.4 MMOL/L (ref 3.6–5.1)
SODIUM SERPL-SCNC: 142 MMOL/L (ref 136–145)
T AXIS: 46 DEGREES
VENTRICULAR RATE: 110 BPM
WBC # BLD AUTO: 11 X10(3) UL (ref 4–11)

## 2025-03-18 PROCEDURE — 84132 ASSAY OF SERUM POTASSIUM: CPT | Performed by: THORACIC SURGERY (CARDIOTHORACIC VASCULAR SURGERY)

## 2025-03-18 PROCEDURE — 94002 VENT MGMT INPAT INIT DAY: CPT

## 2025-03-18 PROCEDURE — 84295 ASSAY OF SERUM SODIUM: CPT

## 2025-03-18 PROCEDURE — 71045 X-RAY EXAM CHEST 1 VIEW: CPT | Performed by: PHYSICIAN ASSISTANT

## 2025-03-18 PROCEDURE — 84132 ASSAY OF SERUM POTASSIUM: CPT

## 2025-03-18 PROCEDURE — 82330 ASSAY OF CALCIUM: CPT

## 2025-03-18 PROCEDURE — 84295 ASSAY OF SERUM SODIUM: CPT | Performed by: THORACIC SURGERY (CARDIOTHORACIC VASCULAR SURGERY)

## 2025-03-18 PROCEDURE — 02100Z9 BYPASS CORONARY ARTERY, ONE ARTERY FROM LEFT INTERNAL MAMMARY, OPEN APPROACH: ICD-10-PCS | Performed by: THORACIC SURGERY (CARDIOTHORACIC VASCULAR SURGERY)

## 2025-03-18 PROCEDURE — 82330 ASSAY OF CALCIUM: CPT | Performed by: THORACIC SURGERY (CARDIOTHORACIC VASCULAR SURGERY)

## 2025-03-18 PROCEDURE — 85610 PROTHROMBIN TIME: CPT | Performed by: PHYSICIAN ASSISTANT

## 2025-03-18 PROCEDURE — 85384 FIBRINOGEN ACTIVITY: CPT | Performed by: PHYSICIAN ASSISTANT

## 2025-03-18 PROCEDURE — 85027 COMPLETE CBC AUTOMATED: CPT | Performed by: PHYSICIAN ASSISTANT

## 2025-03-18 PROCEDURE — 83050 HGB METHEMOGLOBIN QUAN: CPT | Performed by: THORACIC SURGERY (CARDIOTHORACIC VASCULAR SURGERY)

## 2025-03-18 PROCEDURE — 82803 BLOOD GASES ANY COMBINATION: CPT

## 2025-03-18 PROCEDURE — 87081 CULTURE SCREEN ONLY: CPT | Performed by: HOSPITALIST

## 2025-03-18 PROCEDURE — 83735 ASSAY OF MAGNESIUM: CPT | Performed by: PHYSICIAN ASSISTANT

## 2025-03-18 PROCEDURE — 85730 THROMBOPLASTIN TIME PARTIAL: CPT | Performed by: PHYSICIAN ASSISTANT

## 2025-03-18 PROCEDURE — S0028 INJECTION, FAMOTIDINE, 20 MG: HCPCS | Performed by: ANESTHESIOLOGY

## 2025-03-18 PROCEDURE — 85014 HEMATOCRIT: CPT

## 2025-03-18 PROCEDURE — 80048 BASIC METABOLIC PNL TOTAL CA: CPT | Performed by: PHYSICIAN ASSISTANT

## 2025-03-18 PROCEDURE — 021209W BYPASS CORONARY ARTERY, THREE ARTERIES FROM AORTA WITH AUTOLOGOUS VENOUS TISSUE, OPEN APPROACH: ICD-10-PCS | Performed by: THORACIC SURGERY (CARDIOTHORACIC VASCULAR SURGERY)

## 2025-03-18 PROCEDURE — 93312 ECHO TRANSESOPHAGEAL: CPT | Performed by: ANESTHESIOLOGY

## 2025-03-18 PROCEDURE — 83605 ASSAY OF LACTIC ACID: CPT | Performed by: THORACIC SURGERY (CARDIOTHORACIC VASCULAR SURGERY)

## 2025-03-18 PROCEDURE — 85347 COAGULATION TIME ACTIVATED: CPT

## 2025-03-18 PROCEDURE — 93005 ELECTROCARDIOGRAM TRACING: CPT

## 2025-03-18 PROCEDURE — 94150 VITAL CAPACITY TEST: CPT

## 2025-03-18 PROCEDURE — 85018 HEMOGLOBIN: CPT | Performed by: THORACIC SURGERY (CARDIOTHORACIC VASCULAR SURGERY)

## 2025-03-18 PROCEDURE — 82805 BLOOD GASES W/O2 SATURATION: CPT | Performed by: THORACIC SURGERY (CARDIOTHORACIC VASCULAR SURGERY)

## 2025-03-18 PROCEDURE — 82375 ASSAY CARBOXYHB QUANT: CPT | Performed by: THORACIC SURGERY (CARDIOTHORACIC VASCULAR SURGERY)

## 2025-03-18 PROCEDURE — 5A1221Z PERFORMANCE OF CARDIAC OUTPUT, CONTINUOUS: ICD-10-PCS | Performed by: THORACIC SURGERY (CARDIOTHORACIC VASCULAR SURGERY)

## 2025-03-18 PROCEDURE — 82962 GLUCOSE BLOOD TEST: CPT

## 2025-03-18 PROCEDURE — 93010 ELECTROCARDIOGRAM REPORT: CPT | Performed by: INTERNAL MEDICINE

## 2025-03-18 PROCEDURE — 06BQ4ZZ EXCISION OF LEFT SAPHENOUS VEIN, PERCUTANEOUS ENDOSCOPIC APPROACH: ICD-10-PCS | Performed by: THORACIC SURGERY (CARDIOTHORACIC VASCULAR SURGERY)

## 2025-03-18 DEVICE — STERNALPLATE, LADDER, NARROW: Type: IMPLANTABLE DEVICE | Site: CHEST | Status: FUNCTIONAL

## 2025-03-18 DEVICE — STERNALPLATE, X: Type: IMPLANTABLE DEVICE | Site: CHEST | Status: FUNCTIONAL

## 2025-03-18 DEVICE — LOCKING SCREW,AXS,SELF-DRILLING
Type: IMPLANTABLE DEVICE | Site: CHEST | Status: FUNCTIONAL
Brand: AXS, SMARTLOCK

## 2025-03-18 RX ORDER — ROCURONIUM BROMIDE 10 MG/ML
INJECTION, SOLUTION INTRAVENOUS AS NEEDED
Status: DISCONTINUED | OUTPATIENT
Start: 2025-03-18 | End: 2025-03-18 | Stop reason: SURG

## 2025-03-18 RX ORDER — MORPHINE SULFATE 4 MG/ML
4 INJECTION, SOLUTION INTRAMUSCULAR; INTRAVENOUS EVERY 2 HOUR PRN
Status: DISCONTINUED | OUTPATIENT
Start: 2025-03-18 | End: 2025-03-22

## 2025-03-18 RX ORDER — DOCUSATE SODIUM 100 MG/1
100 CAPSULE, LIQUID FILLED ORAL 2 TIMES DAILY
Status: DISCONTINUED | OUTPATIENT
Start: 2025-03-19 | End: 2025-03-22

## 2025-03-18 RX ORDER — POLYETHYLENE GLYCOL 3350 17 G/17G
17 POWDER, FOR SOLUTION ORAL DAILY PRN
Status: DISCONTINUED | OUTPATIENT
Start: 2025-03-18 | End: 2025-03-22

## 2025-03-18 RX ORDER — FAMOTIDINE 10 MG/ML
INJECTION, SOLUTION INTRAVENOUS AS NEEDED
Status: DISCONTINUED | OUTPATIENT
Start: 2025-03-18 | End: 2025-03-18 | Stop reason: SURG

## 2025-03-18 RX ORDER — SENNOSIDES 8.6 MG
8.6 TABLET ORAL 2 TIMES DAILY
Status: DISCONTINUED | OUTPATIENT
Start: 2025-03-19 | End: 2025-03-22

## 2025-03-18 RX ORDER — SODIUM CHLORIDE, SODIUM LACTATE, POTASSIUM CHLORIDE, CALCIUM CHLORIDE 600; 310; 30; 20 MG/100ML; MG/100ML; MG/100ML; MG/100ML
INJECTION, SOLUTION INTRAVENOUS CONTINUOUS PRN
Status: DISCONTINUED | OUTPATIENT
Start: 2025-03-18 | End: 2025-03-18 | Stop reason: SURG

## 2025-03-18 RX ORDER — MAGNESIUM SULFATE 1 G/100ML
1 INJECTION INTRAVENOUS AS NEEDED
Status: DISCONTINUED | OUTPATIENT
Start: 2025-03-18 | End: 2025-03-20

## 2025-03-18 RX ORDER — MORPHINE SULFATE 2 MG/ML
2 INJECTION, SOLUTION INTRAMUSCULAR; INTRAVENOUS EVERY 2 HOUR PRN
Status: DISCONTINUED | OUTPATIENT
Start: 2025-03-18 | End: 2025-03-22

## 2025-03-18 RX ORDER — VANCOMYCIN HYDROCHLORIDE
15 EVERY 12 HOURS
Status: COMPLETED | OUTPATIENT
Start: 2025-03-18 | End: 2025-03-19

## 2025-03-18 RX ORDER — NITROGLYCERIN 20 MG/100ML
INJECTION INTRAVENOUS CONTINUOUS PRN
Status: DISCONTINUED | OUTPATIENT
Start: 2025-03-18 | End: 2025-03-20

## 2025-03-18 RX ORDER — NICOTINE POLACRILEX 4 MG
30 LOZENGE BUCCAL
Status: DISCONTINUED | OUTPATIENT
Start: 2025-03-18 | End: 2025-03-22

## 2025-03-18 RX ORDER — METOPROLOL TARTRATE 25 MG/1
TABLET, FILM COATED ORAL
Status: COMPLETED
Start: 2025-03-18 | End: 2025-03-18

## 2025-03-18 RX ORDER — HEPARIN SODIUM 1000 [USP'U]/ML
INJECTION, SOLUTION INTRAVENOUS; SUBCUTANEOUS AS NEEDED
Status: DISCONTINUED | OUTPATIENT
Start: 2025-03-18 | End: 2025-03-18 | Stop reason: SURG

## 2025-03-18 RX ORDER — NALOXONE HYDROCHLORIDE 0.4 MG/ML
0.08 INJECTION, SOLUTION INTRAMUSCULAR; INTRAVENOUS; SUBCUTANEOUS
Status: DISCONTINUED | OUTPATIENT
Start: 2025-03-18 | End: 2025-03-19

## 2025-03-18 RX ORDER — VANCOMYCIN HYDROCHLORIDE 1 G/20ML
INJECTION, POWDER, LYOPHILIZED, FOR SOLUTION INTRAVENOUS AS NEEDED
Status: DISCONTINUED | OUTPATIENT
Start: 2025-03-18 | End: 2025-03-18 | Stop reason: SURG

## 2025-03-18 RX ORDER — PROTAMINE SULFATE 10 MG/ML
INJECTION, SOLUTION INTRAVENOUS AS NEEDED
Status: DISCONTINUED | OUTPATIENT
Start: 2025-03-18 | End: 2025-03-18 | Stop reason: SURG

## 2025-03-18 RX ORDER — BISACODYL 10 MG
10 SUPPOSITORY, RECTAL RECTAL
Status: DISCONTINUED | OUTPATIENT
Start: 2025-03-18 | End: 2025-03-22

## 2025-03-18 RX ORDER — DEXAMETHASONE SODIUM PHOSPHATE 4 MG/ML
VIAL (ML) INJECTION AS NEEDED
Status: DISCONTINUED | OUTPATIENT
Start: 2025-03-18 | End: 2025-03-18 | Stop reason: SURG

## 2025-03-18 RX ORDER — ACETAMINOPHEN 10 MG/ML
1000 INJECTION, SOLUTION INTRAVENOUS EVERY 6 HOURS PRN
Status: DISPENSED | OUTPATIENT
Start: 2025-03-18 | End: 2025-03-19

## 2025-03-18 RX ORDER — MIDAZOLAM HYDROCHLORIDE 1 MG/ML
INJECTION INTRAMUSCULAR; INTRAVENOUS AS NEEDED
Status: DISCONTINUED | OUTPATIENT
Start: 2025-03-18 | End: 2025-03-18 | Stop reason: SURG

## 2025-03-18 RX ORDER — LIDOCAINE HYDROCHLORIDE 10 MG/ML
INJECTION, SOLUTION EPIDURAL; INFILTRATION; INTRACAUDAL; PERINEURAL AS NEEDED
Status: DISCONTINUED | OUTPATIENT
Start: 2025-03-18 | End: 2025-03-18 | Stop reason: SURG

## 2025-03-18 RX ORDER — POTASSIUM CHLORIDE 29.8 MG/ML
40 INJECTION INTRAVENOUS AS NEEDED
Status: DISCONTINUED | OUTPATIENT
Start: 2025-03-18 | End: 2025-03-20

## 2025-03-18 RX ORDER — POTASSIUM CHLORIDE 14.9 MG/ML
20 INJECTION INTRAVENOUS AS NEEDED
Status: DISCONTINUED | OUTPATIENT
Start: 2025-03-18 | End: 2025-03-20

## 2025-03-18 RX ORDER — DEXMEDETOMIDINE HYDROCHLORIDE 4 UG/ML
INJECTION, SOLUTION INTRAVENOUS CONTINUOUS
Status: DISCONTINUED | OUTPATIENT
Start: 2025-03-18 | End: 2025-03-19

## 2025-03-18 RX ORDER — DEXTROSE MONOHYDRATE 25 G/50ML
50 INJECTION, SOLUTION INTRAVENOUS
Status: DISCONTINUED | OUTPATIENT
Start: 2025-03-18 | End: 2025-03-22

## 2025-03-18 RX ORDER — ONDANSETRON 2 MG/ML
4 INJECTION INTRAMUSCULAR; INTRAVENOUS EVERY 6 HOURS PRN
Status: DISCONTINUED | OUTPATIENT
Start: 2025-03-18 | End: 2025-03-22

## 2025-03-18 RX ORDER — ALBUMIN HUMAN 50 G/1000ML
12.5 SOLUTION INTRAVENOUS ONCE AS NEEDED
Status: ACTIVE | OUTPATIENT
Start: 2025-03-18 | End: 2025-03-19

## 2025-03-18 RX ORDER — CHLORHEXIDINE GLUCONATE ORAL RINSE 1.2 MG/ML
15 SOLUTION DENTAL
Status: DISCONTINUED | OUTPATIENT
Start: 2025-03-18 | End: 2025-03-18

## 2025-03-18 RX ORDER — DEXTROSE MONOHYDRATE AND SODIUM CHLORIDE 5; .45 G/100ML; G/100ML
INJECTION, SOLUTION INTRAVENOUS CONTINUOUS
Status: DISCONTINUED | OUTPATIENT
Start: 2025-03-18 | End: 2025-03-19

## 2025-03-18 RX ORDER — DEXMEDETOMIDINE HYDROCHLORIDE 4 UG/ML
INJECTION, SOLUTION INTRAVENOUS CONTINUOUS PRN
Status: DISCONTINUED | OUTPATIENT
Start: 2025-03-18 | End: 2025-03-18 | Stop reason: SURG

## 2025-03-18 RX ORDER — MAGNESIUM SULFATE HEPTAHYDRATE 40 MG/ML
2 INJECTION, SOLUTION INTRAVENOUS AS NEEDED
Status: DISCONTINUED | OUTPATIENT
Start: 2025-03-18 | End: 2025-03-20

## 2025-03-18 RX ORDER — DIPHENHYDRAMINE HYDROCHLORIDE 50 MG/ML
12.5 INJECTION, SOLUTION INTRAMUSCULAR; INTRAVENOUS EVERY 4 HOURS PRN
Status: DISCONTINUED | OUTPATIENT
Start: 2025-03-18 | End: 2025-03-19

## 2025-03-18 RX ORDER — SODIUM CHLORIDE 9 MG/ML
INJECTION, SOLUTION INTRAVENOUS CONTINUOUS
Status: DISCONTINUED | OUTPATIENT
Start: 2025-03-18 | End: 2025-03-19

## 2025-03-18 RX ORDER — DIPHENHYDRAMINE HYDROCHLORIDE 50 MG/ML
INJECTION, SOLUTION INTRAMUSCULAR; INTRAVENOUS AS NEEDED
Status: DISCONTINUED | OUTPATIENT
Start: 2025-03-18 | End: 2025-03-18 | Stop reason: SURG

## 2025-03-18 RX ORDER — NICOTINE POLACRILEX 4 MG
15 LOZENGE BUCCAL
Status: DISCONTINUED | OUTPATIENT
Start: 2025-03-18 | End: 2025-03-22

## 2025-03-18 RX ORDER — ASPIRIN 81 MG/1
81 TABLET ORAL DAILY
Status: DISCONTINUED | OUTPATIENT
Start: 2025-03-19 | End: 2025-03-22

## 2025-03-18 RX ORDER — PANTOPRAZOLE SODIUM 40 MG/1
40 TABLET, DELAYED RELEASE ORAL
Status: DISCONTINUED | OUTPATIENT
Start: 2025-03-19 | End: 2025-03-22

## 2025-03-18 RX ORDER — MIDAZOLAM HYDROCHLORIDE 1 MG/ML
1 INJECTION INTRAMUSCULAR; INTRAVENOUS EVERY 30 MIN PRN
Status: DISCONTINUED | OUTPATIENT
Start: 2025-03-18 | End: 2025-03-19 | Stop reason: ALTCHOICE

## 2025-03-18 RX ORDER — ROSUVASTATIN CALCIUM 10 MG/1
10 TABLET, COATED ORAL NIGHTLY
Status: DISCONTINUED | OUTPATIENT
Start: 2025-03-18 | End: 2025-03-22

## 2025-03-18 RX ORDER — DOBUTAMINE HYDROCHLORIDE 200 MG/100ML
INJECTION INTRAVENOUS CONTINUOUS PRN
Status: DISCONTINUED | OUTPATIENT
Start: 2025-03-18 | End: 2025-03-20

## 2025-03-18 RX ORDER — DOBUTAMINE HYDROCHLORIDE 200 MG/100ML
INJECTION INTRAVENOUS CONTINUOUS PRN
Status: DISCONTINUED | OUTPATIENT
Start: 2025-03-18 | End: 2025-03-18

## 2025-03-18 RX ADMIN — LIDOCAINE HYDROCHLORIDE 10 ML: 10 INJECTION, SOLUTION EPIDURAL; INFILTRATION; INTRACAUDAL; PERINEURAL at 09:40:00

## 2025-03-18 RX ADMIN — DEXMEDETOMIDINE HYDROCHLORIDE 0.5 MCG/KG/HR: 4 INJECTION, SOLUTION INTRAVENOUS at 11:20:00

## 2025-03-18 RX ADMIN — HEPARIN SODIUM 32000 UNITS: 1000 INJECTION, SOLUTION INTRAVENOUS; SUBCUTANEOUS at 11:11:00

## 2025-03-18 RX ADMIN — SODIUM CHLORIDE, SODIUM LACTATE, POTASSIUM CHLORIDE, CALCIUM CHLORIDE: 600; 310; 30; 20 INJECTION, SOLUTION INTRAVENOUS at 09:35:00

## 2025-03-18 RX ADMIN — HEPARIN SODIUM 10000 UNITS: 1000 INJECTION, SOLUTION INTRAVENOUS; SUBCUTANEOUS at 11:22:00

## 2025-03-18 RX ADMIN — DIPHENHYDRAMINE HYDROCHLORIDE 50 MG: 50 INJECTION, SOLUTION INTRAMUSCULAR; INTRAVENOUS at 09:35:00

## 2025-03-18 RX ADMIN — PROTAMINE SULFATE 250 MG: 10 INJECTION, SOLUTION INTRAVENOUS at 12:54:00

## 2025-03-18 RX ADMIN — DOBUTAMINE HYDROCHLORIDE 5 MCG/KG/MIN: 200 INJECTION INTRAVENOUS at 12:42:00

## 2025-03-18 RX ADMIN — DEXAMETHASONE SODIUM PHOSPHATE 10 MG: 4 MG/ML VIAL (ML) INJECTION at 09:59:00

## 2025-03-18 RX ADMIN — VANCOMYCIN HYDROCHLORIDE 1000 MG: 1 INJECTION, POWDER, LYOPHILIZED, FOR SOLUTION INTRAVENOUS at 09:59:00

## 2025-03-18 RX ADMIN — ROCURONIUM BROMIDE 20 MG: 10 INJECTION, SOLUTION INTRAVENOUS at 11:00:00

## 2025-03-18 RX ADMIN — FAMOTIDINE 20 MG: 10 INJECTION, SOLUTION INTRAVENOUS at 09:35:00

## 2025-03-18 RX ADMIN — ROCURONIUM BROMIDE 80 MG: 10 INJECTION, SOLUTION INTRAVENOUS at 09:40:00

## 2025-03-18 RX ADMIN — ROCURONIUM BROMIDE 50 MG: 10 INJECTION, SOLUTION INTRAVENOUS at 12:11:00

## 2025-03-18 RX ADMIN — MIDAZOLAM HYDROCHLORIDE 2 MG: 1 INJECTION INTRAMUSCULAR; INTRAVENOUS at 11:11:00

## 2025-03-18 RX ADMIN — SODIUM CHLORIDE, SODIUM LACTATE, POTASSIUM CHLORIDE, CALCIUM CHLORIDE: 600; 310; 30; 20 INJECTION, SOLUTION INTRAVENOUS at 12:10:00

## 2025-03-18 RX ADMIN — MIDAZOLAM HYDROCHLORIDE 2 MG: 1 INJECTION INTRAMUSCULAR; INTRAVENOUS at 09:35:00

## 2025-03-18 NOTE — DIETARY NOTE
Clinical Nutrition     Dietitian consult received per cardiac rehab standing order. Pt to be educated by cardiac rehab staff and encouraged to attend outpatient classes taught by SARAH. SARAH available PRN.    Marely Lees, SARAH, LDN, [facility]  Clinical Dietitian  Spectra: [phone]

## 2025-03-18 NOTE — ANESTHESIA PREPROCEDURE EVALUATION
PRE-OP EVALUATION    Patient Name: Gosia Singer    Admit Diagnosis: coronary artery disease    Pre-op Diagnosis: coronary artery disease    CORONARY ARTERY BYPASS GRAFT    Anesthesia Procedure: CORONARY ARTERY BYPASS GRAFT    Surgeons and Role:     * Masoud Horn MD - Primary     * Hu Noyola MD    Pre-op vitals reviewed.  Pulse: 80  Resp: 20  BP: 120/76  SpO2: 97 %  Body mass index is 31.9 kg/m².    Current medications reviewed.  Hospital Medications:   [COMPLETED] mupirocin (Bactroban) 2% nasal ointment 1 Application  1 Application Nasal Once    [START ON 3/19/2025] ceFAZolin (Ancef) 2g in 10mL IV syringe premix  2 g Intravenous On Call    [COMPLETED] mupirocin (Bactroban) 2% nasal ointment        [COMPLETED] metoprolol tartrate (Lopressor) 25 MG tab        EPINEPHrine (Adrenalin) 5 mg in sodium chloride 0.9% 250 mL infusion  1-10 mcg/min Intravenous PRN    insulin regular human (Novolin R, Humulin R) 100 Units in sodium chloride 0.9% 100 mL standard infusion (100 mL)  1-40 Units/hr Intravenous PRN       Outpatient Medications:   Prescriptions Prior to Admission[1]    Allergies: Pravastatin      Anesthesia Evaluation        Anesthetic Complications  (-) history of anesthetic complications         GI/Hepatic/Renal      (+) GERD and well controlled                          Cardiovascular            MET: >4      (+) hypertension     (+) CAD                                Endo/Other    Negative endo/other ROS.                              Pulmonary    Negative pulmonary ROS.                       Neuro/Psych    Negative neuro/psych ROS.                                  Past Surgical History:   Procedure Laterality Date    Back surgery      Colonoscopy  08/09/2016    Dr. Quintero- Repeat in 10 years    Other surgical history      Anal Fistulotomy (Subcutaneous)    Upper gi endoscopy,exam  04/27/2010    EGD     Social History     Socioeconomic History    Marital status:    Tobacco Use    Smoking status: Light  Smoker     Types: Cigars    Smokeless tobacco: Never    Tobacco comments:     Cigars 3-4   Vaping Use    Vaping status: Never Used   Substance and Sexual Activity    Alcohol use: Yes     Alcohol/week: 0.0 standard drinks of alcohol     Comment: Cage done 5/22/2018    Drug use: No    Sexual activity: Yes   Other Topics Concern    Caffeine Concern Yes     Comment: 2 cups cofee daily     Exercise Yes     Comment: no regular regimen     History   Drug Use No     Available pre-op labs reviewed.        Lab Results   Component Value Date    INR 0.89 03/10/2025         Airway      Mallampati: III  Mouth opening: >3 FB  TM distance: > 6 cm  Neck ROM: full Cardiovascular    Cardiovascular exam normal.         Dental    Dentition appears grossly intact         Pulmonary    Pulmonary exam normal.                 Other findings              ASA: 4   Plan: general  NPO status verified and patient meets guidelines.    Post-procedure pain management plan discussed with surgeon and patient.      Plan/risks discussed with: patient, spouse and child/children  Use of blood product(s) discussed with: patient    Consented to blood products.          Present on Admission:  **None**             [1]   Medications Prior to Admission   Medication Sig Dispense Refill Last Dose/Taking    calcium carbonate 500 MG Oral Chew Tab Chew 2 tablets (1,000 mg total) by mouth 2 (two) times daily as needed for Heartburn.   Past Week    rosuvastatin 10 MG Oral Tab Take 1 tablet (10 mg total) by mouth nightly.   3/16/2025    aspirin 81 MG Oral Tab EC Take 1 tablet (81 mg total) by mouth daily.   3/10/2025    Testosterone Cypionate 200 MG/ML Intramuscular Solution Prefilled Syringe Inject into the muscle As Directed. Every 21 days   Past Month    METFORMIN  MG Oral Tablet 24 Hr TAKE 1 TABLET(500 MG) BY MOUTH DAILY 90 tablet 3 3/16/2025

## 2025-03-18 NOTE — PLAN OF CARE
Received pt. S/p CVOR around 14:15. Received intubated and sedated on propofol and precedex. Received on dobutamine and insulin gtt, dobs titrated to keep SBP <130. Nitroglycerin on briefly during SBT to keep SBP <130 as well.     16:06: sedation turned off to assess neuro status   1622 : pt. Awake, NUNEZ to command, Dr. Quinn and Dr. Noyola notified   17:01: placed on SBT  17:55: extubated to 3L NC     CVP: 11-13  CI: >2  CO: >4.5  Svo2: >65    Chest tubes in place with 160cc out out for shift   [place] in place with 580cc out for shift     Family at bedside, updated and participating in plan of care.

## 2025-03-18 NOTE — ANESTHESIA PROCEDURE NOTES
Central Line    Date/Time: 3/18/2025 9:50 AM    Performed by: Harvey Quinn MD  Authorized by: Harvey Quinn MD    General Information and Staff    Procedure Start:  3/18/2025 9:50 AM  Procedure End:  3/18/2025 10:00 AM  Anesthesiologist:  Harvey Quinn MD  Performed by:  Anesthesiologist  Patient Location:  OR  Indication: central venous access and CVP monitoring    Site Identification: real time ultrasound guided        Procedure Detail    Patient Position:  Trendelenburg  Laterality:  Right  Site:  Internal jugular  Prep:  Chloraprep  Catheter Size:  9 Fr  Catheter Type:  MAC introducer  Number of Lumens:  Double lumen  Procedure Detail: target vein identified, needle advanced into vein and blood aspirated and guidewire advanced into vein    Seldinger Technique?: Yes    Intravenous Verification: verified by ultrasound and venous blood return    Post Insertion: all ports aspirated, all ports flushed easily, guidewire was removed intact, line was sutured in place and dressing was applied      Assessment    Events: patient tolerated procedure well with no complications      PA Catheter Placement    PA Catheter Placed?: Yes    PA Catheter Type:  Oximetric  PA Catheter Size:  8  Laterality:  Right  Site:  Internal jugular  Placement Confirmation: pressure tracing changes and verified by BUCKY    PA Catheter Depth (cm):  50  Events: patient tolerated procedure well with no complications      Additional Comments

## 2025-03-18 NOTE — ANESTHESIA POSTPROCEDURE EVALUATION
[facility] Patient Status:  Inpatient   Age/Gender 59 year old male MRN [id number]   Location [facility] 6NE-A Attending Masoud Horn MD   Hosp Day # 0 PCP Ida Kebede MD       Anesthesia Post-op Note    CORONARY ARTERY BYPASS GRAFT X4 USING LEFT INTERNAL MAMMARY ARTERY AND ENDOSCOPIC VEIN HARVEST FROM THE LEFT LEG, AND INTRAOPERATIVE TRANSESOPHAGEAL ECHOCARDIOGRAM    Procedure Summary       Date: 03/18/25 Room / Location:  CVOR 03 HYBRID /  CVOR    Anesthesia Start: 0935 Anesthesia Stop: 1411    Procedure: CORONARY ARTERY BYPASS GRAFT X4 USING LEFT INTERNAL MAMMARY ARTERY AND ENDOSCOPIC VEIN HARVEST FROM THE LEFT LEG, AND INTRAOPERATIVE TRANSESOPHAGEAL ECHOCARDIOGRAM Diagnosis: (coronary artery disease)    Surgeons: Masoud Horn MD Anesthesiologist: Harvey Quinn MD    Anesthesia Type: general ASA Status: 4            Anesthesia Type: general    Vitals Value Taken Time   /56 03/18/25 1412   Temp 97.5 03/18/25 1412   Pulse 110 03/18/25 1411   Resp 18 03/18/25 1411   SpO2 97 % 03/18/25 1411   Vitals shown include unfiled device data.        Patient Location: ICU    Anesthesia Type: general    Airway Patency: intubated    Postop Pain Control: adequate    Mental Status: sedated until time of extubation    Nausea/Vomiting: none    Cardiopulmonary/Hydration status: stable euvolemic    Complications: no apparent anesthesia related complications    Postop vital signs: stable    Dental Exam: Unchanged from Preop    Sign out given to ICU staff.

## 2025-03-18 NOTE — CM/SW NOTE
Per protocol, patient set up with [facility] @ discharge    [facility]

## 2025-03-18 NOTE — ANESTHESIA PROCEDURE NOTES
Arterial Line    Date/Time: 3/18/2025 9:45 AM    Performed by: Harvey Quinn MD  Authorized by: Harvey Quinn MD    General Information and Staff    Procedure Start:  3/18/2025 9:45 AM  Procedure End:  3/18/2025 9:46 AM  Anesthesiologist:  Harvey Quinn MD  Performed By:  Anesthesiologist  Patient Location:  OR  Indication: continuous blood pressure monitoring and blood sampling needed    Site Identification: real time ultrasound guided and surface landmarks    Preanesthetic Checklist: 2 patient identifiers, IV checked, risks and benefits discussed, monitors and equipment checked, pre-op evaluation, timeout performed, anesthesia consent and sterile technique used    Procedure Details    Catheter Size:  20 G  Catheter Length:  1 and 3/4 inch  Catheter Type:  Arrow  Seldinger Technique?: Yes    Laterality:  Left  Site:  Radial artery  Site Prep: chlorhexidine    Line Secured:  Tape and Tegaderm    Assessment    Events: patient tolerated procedure well with no complications      Medications  3/18/2025 9:45 AM      Additional Comments         Problem: Falls - Risk of:  Goal: Will remain free from falls  Description  Will remain free from falls  3/25/2019 0034 by Eugenia Zhu RN, BSN  Outcome: Ongoing  Note:   Patient fall risk. Patient had no falls this shift. RN educated on fall precautions/preventions. Patient's call light and possessions with in reach. Bed alarm activated with bed in lowest locked position. Bed alarm on in zone 2. Chair alarm being utilized. Hourly rounding preformed. Problem: Falls - Risk of:  Goal: Absence of physical injury  Description  Absence of physical injury  3/25/2019 0034 by Eugenia Zhu RN, BSN  Outcome: Ongoing  Note:   Patient fall risk. Patient had no falls this shift. RN educated on fall precautions/preventions. Patient's call light and possessions with in reach. Bed alarm activated with bed in lowest locked position. Bed alarm on in zone 2. Chair alarm being utilized. Hourly rounding preformed. Problem: OXYGENATION/RESPIRATORY FUNCTION  Goal: Patient will maintain patent airway  3/25/2019 0034 by Eugenia Zhu RN, BSN  Outcome: Ongoing  Note:   Patient's respirations between 12-20. O2 is greater than 90% on room air. Respirations are even and unlabored. Lung sounds are diminished. Problem: HEMODYNAMIC STATUS  Goal: Patient has stable vital signs and fluid balance  Outcome: Ongoing  Note:   Vitals within normal limits and assessed every four hours and as needed. Adequate I&Os performed. Labs being monitored. Will continue to monitor. Problem: FLUID AND ELECTROLYTE IMBALANCE  Goal: Fluid and electrolyte balance are achieved/maintained  3/25/2019 0034 by Eugenia Zhu RN, BSN  Outcome: Ongoing  Note:   Labs being monitored     Problem: Discharge Planning  Goal: Knowledge of discharge instructions  Description  Knowledge of discharge instructions     3/25/2019 0034 by Eugenia Zhu RN, BSN  Outcome: Ongoing  Note:   [place]  Plan for discharge today. Problem: Musculor/Skeletal Functional Status  Goal: Highest potential functional level  Outcome: Ongoing  Note:   Patient encouraged to perform daily activities with minimal assistance. Help provided to patient when needed.  Patient tolerating performing ADLs well

## 2025-03-18 NOTE — CONSULTS
[facility] Hospitalist H&P/Consult note       CC: post op med management , asked o see pt by Dr Horn    PCP: Ida Kebede MD    History of Present Illness: Patient is a 59 year old male with PMH sig for hld, predm here for scheduled CABG  Had abnornal cta as outpt prompting coronary angiogram, found to have complex lad,/ diagnol and and rPDA disease    PMH  Past Medical History:    Coronary atherosclerosis    Corrosive esophagitis    High cholesterol    Nonspecific elevation of levels of transaminase or lactic acid dehydrogenase (LDH)    Prediabetes    Unspecified essential hypertension    Upper respiratory infection        PSH  Past Surgical History:   Procedure Laterality Date    Back surgery      Colonoscopy  08/09/2016    Dr. Quintero- Repeat in 10 years    Other surgical history      Anal Fistulotomy (Subcutaneous)    Upper gi endoscopy,exam  04/27/2010    EGD        ALL:  Allergies[1]     Home Medications:  Medications Taking[2]      Soc Hx  Social History     Tobacco Use    Smoking status: Light Smoker     Types: Cigars    Smokeless tobacco: Never    Tobacco comments:     Cigars 3-4   Substance Use Topics    Alcohol use: Yes     Alcohol/week: 0.0 standard drinks of alcohol     Comment: Cage done 5/22/2018        Fam Hx  Family History   Problem Relation Age of Onset    Diabetes Mother     Heart Disease Father        Review of Systems  Unable to obtain      OBJECTIVE:  /76 (BP Location: Right arm)   Pulse 110   Resp 19   Ht 5' 11\" (1.803 m)   Wt 228 lb 11.2 oz (103.7 kg)   SpO2 97%   BMI 31.90 kg/m²    Intuabed sedated  RRR  Multiple lines / tube  No edema  Palp distal pulses    Diagnostic Data:    CBC/Chem  No results for input(s): \"WBC\", \"HGB\", \"MCV\", \"PLT\", \"BAND\", \"INR\" in the last 168 hours.    Invalid input(s): \"LYM#\", \"MONO#\", \"BASOS#\", \"EOSIN#\"    No results for input(s): \"NA\", \"K\", \"CL\", \"CO2\", \"BUN\", \"CREATSERUM\", \"GLU\", \"CA\", \"CAION\", \"MG\", \"PHOS\" in the last 168 hours.    No results for  input(s): \"ALT\", \"AST\", \"ALB\", \"AMYLASE\", \"LIPASE\", \"LDH\" in the last 168 hours.    Invalid input(s): \"ALPHOS\", \"TBIL\", \"DBIL\", \"TPROT\"    No results for input(s): \"TROP\" in the last 168 hours.         Radiology: XR CHEST AP PORTABLE  (CPT=71045)    Result Date: 3/10/2025  PROCEDURE:  XR CHEST AP PORTABLE  (CPT=71045)  TECHNIQUE:  AP chest radiograph was obtained.  COMPARISON:  [facility], XR, XR CHEST PA + LAT CHEST (CPT=71046), 6/17/2022, 2:23 PM.  INDICATIONS:  Z01.818 Pre-op testing I25.10 CAD (coronary artery disease), native coronary artery  PATIENT STATED HISTORY: (As transcribed by Technologist)  Patient offered no additional history at this time.    FINDINGS:  The heart is normal in size.  The lungs are clear of acute-appearing disease process.  The costophrenic angles are sharp.  There is no active disease seen on the basis of portable chest radiography.            CONCLUSION:  No active disease seen.   LOCATION:  [place]      Dictated by (CST): Ignacio Gan MD on 3/10/2025 at 9:55 AM     Finalized by (CST): Ignacio Gan MD on 3/10/2025 at 9:55 AM       CATH ANGIO    Result Date: 3/5/2025  Marquez Lentz MD     3/5/2025  8:02 AM [facility] Location: Cath Lab  [facility] [id number] N [id number] Admission Date 3/5/2025 Procedure Date 3/5/2025 Attending Physician Javy Dye MD Procedure Physician Marquez Lentz MD   CARDIAC CATHETERIZATION REPORT  PREOPERATIVE DIAGNOSIS:  epigastric discomfort, hx of DM2; abnormal coronary CTA suggestive of hemodynamically obstructive disease in the LAD/diagonal territory POSTOPERATIVE DIAGNOSIS:  multi-vessel coronary artery disease- LAD and RCA. PROCEDURE PERFORMED:  left heart catheterization, left ventriculogram, selective coronary angiography, IFR hemodynamic testing to the LCx/OM territory   PROCEDURE:  The patient was brought to the cardiac catheterization lab in the fasting state.  Informed consent was obtained.  Moderate sedation was employed  using a total of IV Versed 3mg and IV fentanyl 75mcg.  I directly observed the patient from 0712 to 0745, for a total of 33 minutes, and an independent trained observer was present and assisted in the monitoring of the patient's level of consciousness and physiological status, watching the heart rate, blood pressure, oximetry, and rhythm, in addition to total moderation time.  ACCESS/CATHETER PLACEMENT:   The right radial area was prepped and draped in a sterile manner and anesthetized with 2% lidocaine.  The right radial artery was accessed, and a 6-English, 11 cm sheath was placed.  Left and right selective coronary angiography was performed using a 6F Tiger4.0 catheter.  A pigtail catheter was used to cross the aortic valve, measure left ventricular pressure and perform a 30 degree NORTON ventriculogram.  The catheter was pulled across the valve to assess for aortic stenosis.  At the conclusion of the study, the right radial artery hemostasis was performed using a radial band.   FINDINGS:  1.  Left heart catheterization:  Left ventricle: 99/14 mmHg Aorta: 96/69/81 mmHg Left ventriculogram demonstrated a LV ejection fraction of 55% without significant mitral regurgitation; there are no regional wall motion abnormalities.  There was no aortic stenosis upon pullback of the catheter. 2.  Selective coronary angiography:  Left main artery: The left main artery is a medium size bifurcating vessel.  There is a proximal bend in the artery that tapers to half diameter in the mid/distal segment. LAD:  The left anterior descending artery is a medium size vessel that wraps around the apex and gives rise to a mid diagonal branch.  The proximal/mid LAD is diffusely calcified with an 80% stenosis.  Additionally, the vessel is tortuous in that segment, which also involves the diagonal takeoff.  LCx: The left circumflex artery is a medium size vessel that gives rise to a high obtuse marginal and a smaller mid obtuse marginal.  The  ostial LCx is mildly narrowed, approximately 20-30%. Otherwise, mild luminal irregularities present. RCA:  The right coronary artery is a medium size, dominant vessel that gives rise to a medium rPDA.  There is a mid 90% rPDA stenosis present. INTERVENTIONAL PROCEDURE: Decision was made to determine if there was hemodynamically significant disease from the LM artery into the LCx/OM distribution. Heparin was used for systemic anticoagulation.  The LM artery was engaged with a 6F EBU3.5 guide catheter; a Frida Omniwire was normalized in the aorta and iFR testing was performed to the LCx and proximal OM branches; both measurements = 0.93, signifying a hemodynamically insignificant stenosis.  Post iFR angiography was unchanged from diagnostic appearance.   MEDICATIONS:  See nursing record.  COMPLICATIONS:  No major complications were observed during this visit to the catheterization lab.  IMPRESSION:  1.  Left heart catheterization: LVEDP 14mmHg, no aortic stenosis.  LVEF 55% with normal wall motion, no significant mitral regurgitation 2.  Coronary angiography:  right dominant system - LM: 50% mid-distal tapering - LAD: 80% calcified/tortuous mid bifurcation stenosis .  - LCx: 30% ostial narrowing - RCA: 90% rPDA stenosis 3. iFR testing to the LCx/OM = 0.93, signifying a non-hemodynamically significant narrowing.  RECOMMENDATIONS: Complex LAD/diagonal and rPDA disease in setting of DM2.  Will refer to CV surgery to discuss surgical revascularization options.      CATH ANGIO    Result Date: 3/5/2025  This exam has been completed. Please refer to Notes for the results to this procedure.       ASSESSMENT / PLAN:     Patient is a 59 year old male with PMH sig for hld, predm here for scheduled CABG    Impression    -mv CAD sp CABG 03/18/25  -post op pain    -HLD  -predm, A1c 6,1    Plan    -post op surgical care per cvs   Repeat labs in am  Insulin drip per protocol      Further recommendations pending patient's clinical  course. Graciela hospitalist to continue to follow patient while in house    Patient and/or patient's family given opportunity to ask questions and note understanding and agreeing with therapeutic plan as outlined    [facility] Hospitalist  [phone]  Answering Service: [phone]           [1]   Allergies  Allergen Reactions    Pravastatin SWELLING, FATIGUE and OTHER (SEE COMMENTS)     And tingling   [2]   Outpatient Medications Marked as Taking for the 3/18/25 encounter (Hospital Encounter)   Medication Sig Dispense Refill    calcium carbonate 500 MG Oral Chew Tab Chew 2 tablets (1,000 mg total) by mouth 2 (two) times daily as needed for Heartburn.      rosuvastatin 10 MG Oral Tab Take 1 tablet (10 mg total) by mouth nightly.      aspirin 81 MG Oral Tab EC Take 1 tablet (81 mg total) by mouth daily.      Testosterone Cypionate 200 MG/ML Intramuscular Solution Prefilled Syringe Inject into the muscle As Directed. Every 21 days      METFORMIN  MG Oral Tablet 24 Hr TAKE 1 TABLET(500 MG) BY MOUTH DAILY 90 tablet 3

## 2025-03-18 NOTE — ANESTHESIA PROCEDURE NOTES
Procedure Performed: BUCKY       Start Time:  3/18/2025 10:00 AM       End Time:      Preanesthesia Checklist:  Patient identified, IV assessed, risks and benefits discussed, monitors and equipment assessed, procedure being performed at surgeon's request and anesthesia consent obtained.    General Procedure Information  Diagnostic Indications for Echo:  assessment of ascending aorta  Physician Requesting Echo: Masoud Horn MD  Location performed:  OR  Intubated  Bite block placed  Heart visualized  Probe Insertion:  Easy  Probe Type:  Multiplane  Modalities:  2D only, color flow mapping, pulse wave Doppler, continuous wave Doppler and 3D    Echocardiographic and Doppler Measurements    Ventricles    Right Ventricle:  Cavity size normal.  Hypertrophy not present.  Thrombus not present.  Global function normal.    Left Ventricle:  Cavity size normal.  Hypertrophy not present.  Thrombus not present.  Global Function normal.  Ejection Fraction 45%.      Ventricular Regional Function:  1- Basal Anteroseptal:  normal  2- Basal Anterior:  normal  3- Basal Anterolateral:  normal  4- Basal Inferolateral:  normal  5- Basal Inferior:  hypokinetic  6- Basal Inferoseptal:  normal  7- Mid Anteroseptal:  normal  8- Mid Anterior:  normal  9- Mid Anterolateral:  normal  10- Mid Inferolateral:  normal  11- Mid Inferior:  hypokinetic  12- Mid Inferoseptal:  normal  13- Apical Anterior:  normal  14- Apical Lateral:  normal  15- Apical Inferior:  hypokinetic  16- Apical Septal:  normal  17- [place]:  normal      Valves    Aortic Valve:  Annulus normal.  Stenosis not present.  Regurgitation absent.  Leaflets normal.  Leaflet motions normal.      Mitral Valve:  Annulus normal.  Stenosis not present.  Regurgitation absent.  Leaflets normal.  Leaflet motions normal.      Tricuspid Valve:  Annulus normal.  Stenosis not present.  Regurgitation absent.  Leaflets normal.  Leaflet motions normal.    Pulmonic Valve:  Annulus normal.  Stenosis not  present.  Regurgitation absent.        Aorta    Ascending Aorta:  Size normal.  Dissection not present.  Plaque thickness less than 3 mm.  Mobile plaque not present.    Aortic Arch:  Size normal.  Dissection not present.  Plaque thickness less than 3 mm.  Mobile plaque not present.    Descending Aorta:  Size normal.  Dissection not present.  Plaque thickness less than 3 mm.  Mobile plaque not present.        Atria    Right Atrium:  Size normal.  Spontaneous echo contrast not present.  Thrombus not present.  Tumor not present.  Device not present.      Left Atrium:  Size normal.  Spontaneous echo contrast not present.  Thrombus not present.  Tumor not present.  Device not present.    Left atrial appendage normal.      Septa    Atrial Septum:  Intra-atrial septal morphology normal.      Ventricular Septum:  Intra-ventricular septum morphology normal.          Other Findings  Pericardium:  normal  Pleural Effusion:  none      Anesthesia Information  Performed Personally  Anesthesiologist:  Harvey Quinn MD      Echocardiogram Comments:       Gosia Singer jade 59 year old male w/ CAD s/p CABG    1. LV is mildly dilated, mildly reduced function EF 40-45%. Inferior wall hypokinesis.  2. RV is normal size and function  3. No hemodynamically significant valvular pathology  4. IAS intact to CFD  5. Aorta normal size no dissection  6. No pericardial effusion  Post  Post Intervention Follow-up Study:See addtional report  Ventricular FXN:  Global FXN: Improved   Regional FXN: Unchanged  Valve FXN:  Native Valve:Improved            Comments: After bypass, a 4vCABG was performed.    1. LV function is improved, EF 45-50%  2. RV unchanged  3. No significant valvular changes  4. No aortic dissection

## 2025-03-18 NOTE — CONSULTS
[facility] Consultation    Gosia Singer Patient Status:  Inpatient    4/15/1965 MRN [id number]   [facility] 6NE-A Attending Masoud Horn MD   Hosp Day # 0 PCP Ida Kebede MD     Reason for Consultation:  post CABG mgt      History of Present Illness:  Gosia Singer is a a(n) 59 year old male. He sees Dr. Dye for CAD, Diabetes, Dyslipidemia .  + tobacco use in past.  Recent gated coronary CTA was abnormal in LAD distribution. Leading to cath, 3/5/25 by Dr. Lentz showing multi-vessel CAD.  He underwent CABG x 4 today . Currently intubated, sedated. On IV dobutamine    History:  Past Medical History:    Coronary atherosclerosis    Corrosive esophagitis    High cholesterol    Nonspecific elevation of levels of transaminase or lactic acid dehydrogenase (LDH)    Prediabetes    Unspecified essential hypertension    Upper respiratory infection     Past Surgical History:   Procedure Laterality Date    Back surgery      Colonoscopy  2016    Dr. Quintero- Repeat in 10 years    Other surgical history      Anal Fistulotomy (Subcutaneous)    Upper gi endoscopy,exam  2010    EGD     Family History   Problem Relation Age of Onset    Diabetes Mother     Heart Disease Father          Allergies:  Allergies[1]    Medications:   chlorhexidine gluconate  15 mL Mouth/Throat BID@0800,2000    [START ON 3/19/2025] aspirin  81 mg Oral Daily    rosuvastatin  10 mg Oral Nightly    [START ON 3/19/2025] sennosides  8.6 mg Oral BID    [START ON 3/19/2025] docusate sodium  100 mg Oral BID    vancomycin  15 mg/kg (Adjusted) Intravenous Q12H    [START ON 3/19/2025] metoprolol tartrate  12.5 mg Oral 2x Daily(Beta Blocker)    mupirocin  1 Application Nasal BID    [START ON 3/19/2025] pantoprazole  40 mg Intravenous QAM AC    Or    [START ON 3/19/2025] pantoprazole  40 mg Oral QAM AC    ceFAZolin  2 g Intravenous Q8H       Continuous Infusions:   propofol 30 mcg/kg/min (25 7715)    dexmedetomidine      sodium  chloride 10 mL/hr (03/18/25 1431)    DOBUTamine 4 mcg/kg/min (03/18/25 1430)    nitroGLYCERIN in dextrose 5%      norepinephrine      NitroPRUSSide (Nipride) 50 mg in dextrose 5% 250 mL infusion      dextrose 5%-sodium chloride 0.45% 80 mL/hr (03/18/25 1431)    sodium chloride      HYDROmorphone in sodium chloride 0.9%      insulin regular 2 Units/hr (03/18/25 1429)       Social History:   reports that he has been smoking cigars. He has never used smokeless tobacco. He reports current alcohol use. He reports that he does not use drugs.    Review of Systems:  All systems were reviewed and are negative except as described above in HPI.    Physical Exam:      Wt Readings from Last 3 Encounters:   03/18/25 228 lb 11.2 oz (103.7 kg)   02/25/25 235 lb (106.6 kg)   06/17/22 223 lb (101.2 kg)       Vitals:    03/18/25 1425 03/18/25 1430 03/18/25 1435 03/18/25 1445   BP:  115/77  (!) 117/92   BP Location:       Pulse: 108 108 110 110   Resp: 19 19 18 19   Temp:  96.8 °F (36 °C)  97 °F (36.1 °C)   TempSrc:  Pulmonary Artery  Pulmonary Artery   SpO2: 98% 98% 97% 98%   Weight:       Height:           Temp:  [96.8 °F (36 °C)-97 °F (36.1 °C)] 97 °F (36.1 °C)  Pulse:  [] 110  Resp:  [17-20] 19  BP: (110-120)/(76-92) 117/92  SpO2:  [97 %-99 %] 98 %  AO: (-3-137)/(-11-82) 137/82  FiO2 (%):  [50 %-60 %] 50 %    Temp: 97 °F (36.1 °C)  Pulse: 110  Resp: 19  BP: 117/92  AO: 137/82  FiO2 (%): 50 %      General:  intubated  HEENT: No focal deficits.  Neck: No JVD, carotids 2+ no bruits.  Cardiac: Regular S1S2.  No S3, S4, rub, click.  No murmur.  Lungs: Clear to auscultation and percussion.  Abdomen: Soft, non-tender.   Extremities: No LE edema.  No clubbing or cyanosis  Neurologic:  sedated  Skin: Warm and dry.     Labs:      HEM:  Recent Labs   Lab 03/18/25  1433   WBC 11.0   HGB 15.3   HCT 44.5   .0*       Chem:  Recent Labs   Lab 03/18/25  1433      K 4.4      CO2 28.0   BUN 17   CREATSERUM 1.14   MG 2.4        Recent Labs   Lab 03/18/25  1433   INR 1.12                     No results found for: \"TROP\", \"CKMB\"      Invalid input(s): \"PBNPML\"                 Telemetry: SR/ST    Laboratories and Data:  Diagnostics:    EKG, 3/18/2025:  ST, 110/min, LAD    CXR, 3/18/2025:            Impression:    1.  CAD.  Multivessel disease. Worst in LAD/Diagonal distribution, s/p 4 vessel CABG, 3/18/25.  Stable hemodynamics and rhythm    2. Diabetes    3. Dyslipidemia     4. Tobacco use    Recommend:    1.  Extubate today  2. Dobutamine per CV surgery  3. Resume asa , statin in am          Nolberto Guerra MD  3/18/2025  3:01 PM         [1]   Allergies  Allergen Reactions    Pravastatin SWELLING, FATIGUE and OTHER (SEE COMMENTS)     And tingling

## 2025-03-18 NOTE — OPERATIVE REPORT
Date of operation: 3/18/2025    Preoperative diagnosis: CAD    Postoperative diagnosis: CAD    Operation: Coronary Artery Bypass Graft times 4___ with :                    Left internal mammary artery to the left anterior descending artery                    Reverse greater saphenous vein graft to the diagonal artery                    Reverse greater saphenous vein graft to the obtuse marginal / Ramus artery                    Reverse greater saphenous vein graft to the posterior descending artery               Ultrasound mapping of bilateral saphenous veins     Endoscopic harvest of left greater saphenous vein     Titanium plate closure of complex sternal wound      Surgeon: Hu Noyola    Asst.: Masoud CORRALES, Pavan CORRALES    Surgical Tasks Performed by Assistant: Under the Surgeon’s direction,  the assistant has facilitated the operation by performing duties that included but were not limited to: exposure of the operative field, opening and closing skin, harvesting grafts, dissecting tissue, removing tissue, implanting devices, obtaining hemostasis, and /or otherwise altering tissues    Medications: A beta blocker was taken within 12 hours of the skin incision   an antibiotic was administered prior to the skin incision    Brief history: Patient with symptomatic CAD, had cardiac cath that confirmed severe CAD.  The patient is recommended surgical revascularization of the coronaries.     The risks, benefits, and alternatives were discussed in detail including but not limited to the risk of bleeding, infection, organ failure, need for additional procedures, stroke and death.  The patient understood and agreed to proceed with the planned operation.     Operative procedure:  The patient was placed in the supine position and general anesthetic was administered. The ultrasound probe was used to evaluate the bilateral saphenous veins.  They were felt to be adequate size for conduit.      After being  prepped and draped in the usual aseptic fashion, a midline incision was made and taken down through subcutaneous tissue onto sternum. A sternotomy was performed and the mammary retractor was inserted. The left internal mammary artery was then taken down,  divided distally and soaked in papaverine for later bypass. While this was being done a segment of greater saphenous vein was harvested endoscopically  from the left lower extremity.     The pericardium was opened and suspended in cradle. Pursestring sutures were placed in the ascending aorta and the right atrial appendage and the patient was systemically heparinized and cannulated in the standard fashion. An antegrade needle was placed in the ascending aorta and retrograde in the coronary sinus. The patient was placed on full bypass, cooled and the aorta crossclamped.    Cardioplegia was administered intermittently throughout the procedure and the protection was felt to be adequate.     Following full arrest, the __PDA__ was opened. It admitted a 1.5 mm probe proximally and distally and was anastomosed to  a segment of vein using 7-0 Prolene suture. There was excellent flow through the graft.     Following cardioplegia the __OM/Ramus_ was opened and admitted a 1.5 mm probe proximally and distally. It was anastomosed to segment of vein using 7-0 Prolene suture. There was excellent flow through the graft.     Following cardioplegia the __diag_ was opened and admitted a 1.5 mm probe proximally and distally. It was anastomosed to segment of vein using 7-0 Prolene suture. There was excellent flow through the graft.     LIMA  Following cardioplegia the LAD was opened and admitted a 1.5 mm probe proximally and distally. It was anastomosed  to the mammary artery  using 8-0 Prolene suture. The mammary artery was tacked down onto the surface of the heart using 6-0 Prolene suture.    The LIMA was allowed to reperfuse the LAD distribution while warm retrograde blood was  administered to reperfuse/ reanimate the heart.  The patient was rewarmed and punch aortotomies were created in the ascending aorta and the vein grafts were cut to size, beveled appropriately and anastomosed using 6-0 Prolene suture proximally.     The usual de-airing techniques were performed. The cross-clamp was released, the heart began beating and the patient was then weaned from bypass and  without difficulty. The patient was decannulated, protamine was given and all grafts were pulsatile .    Temporary bipolar ventricular and atrial pacing wires were placed, and 2 chest tubes were placed.   The sternum was reapproximated using interrupted double stainless steel wires.      Titanium plates were used to fortify the complex sternal wound closure.     A layered closure of the fascia subcutaneous tissue and skin was performed.   The sponge and instrument count was correct at the termination of the procedure.   The patient passed the 5   PAminute bleed test times three.     The total cardiopulmonary bypass time was __81__ minutes and the aortic cross-clamp time was __66__ minutes.  ischemic time of _46___ minutes.   The patient was taken in critical but stable condition to the SICU.    Findings: Good distal targets , Good vein conduit , small LIMA with good flow   Specimens Removed: none  Estimated Blood Loss: 200cc Cell saver used  Blood Administered: none  Complications: none  Grafts/Implants: titanium plates   Anesthesia: General  Cardioplegia 2 liter

## 2025-03-19 ENCOUNTER — APPOINTMENT (OUTPATIENT)
Dept: GENERAL RADIOLOGY | Facility: HOSPITAL | Age: 60
End: 2025-03-19
Attending: PHYSICIAN ASSISTANT
Payer: COMMERCIAL

## 2025-03-19 ENCOUNTER — APPOINTMENT (OUTPATIENT)
Dept: GENERAL RADIOLOGY | Facility: HOSPITAL | Age: 60
End: 2025-03-19
Attending: THORACIC SURGERY (CARDIOTHORACIC VASCULAR SURGERY)
Payer: COMMERCIAL

## 2025-03-19 LAB
ATRIAL RATE: 111 BPM
BASOPHILS # BLD AUTO: 0.01 X10(3) UL (ref 0–0.2)
BASOPHILS NFR BLD AUTO: 0.1 %
BUN BLD-MCNC: 18 MG/DL (ref 9–23)
CALCIUM BLD-MCNC: 8.5 MG/DL (ref 8.7–10.6)
CHLORIDE SERPL-SCNC: 108 MMOL/L (ref 98–112)
CO2 SERPL-SCNC: 27 MMOL/L (ref 21–32)
CREAT BLD-MCNC: 1.2 MG/DL
EGFRCR SERPLBLD CKD-EPI 2021: 69 ML/MIN/1.73M2 (ref 60–?)
EOSINOPHIL # BLD AUTO: 0 X10(3) UL (ref 0–0.7)
EOSINOPHIL NFR BLD AUTO: 0 %
ERYTHROCYTE [DISTWIDTH] IN BLOOD BY AUTOMATED COUNT: 12.9 %
GLUCOSE BLD-MCNC: 116 MG/DL (ref 70–99)
GLUCOSE BLD-MCNC: 120 MG/DL (ref 70–99)
GLUCOSE BLD-MCNC: 131 MG/DL (ref 70–99)
GLUCOSE BLD-MCNC: 132 MG/DL (ref 70–99)
GLUCOSE BLD-MCNC: 135 MG/DL (ref 70–99)
GLUCOSE BLD-MCNC: 141 MG/DL (ref 70–99)
GLUCOSE BLD-MCNC: 145 MG/DL (ref 70–99)
GLUCOSE BLD-MCNC: 164 MG/DL (ref 70–99)
GLUCOSE BLD-MCNC: 171 MG/DL (ref 70–99)
GLUCOSE BLD-MCNC: 182 MG/DL (ref 70–99)
HCT VFR BLD AUTO: 44.7 %
HGB BLD-MCNC: 15.1 G/DL
IMM GRANULOCYTES # BLD AUTO: 0.06 X10(3) UL (ref 0–1)
IMM GRANULOCYTES NFR BLD: 0.4 %
LYMPHOCYTES # BLD AUTO: 0.98 X10(3) UL (ref 1–4)
LYMPHOCYTES NFR BLD AUTO: 7.2 %
MAGNESIUM SERPL-MCNC: 2.1 MG/DL (ref 1.6–2.6)
MCH RBC QN AUTO: 30.6 PG (ref 26–34)
MCHC RBC AUTO-ENTMCNC: 33.8 G/DL (ref 31–37)
MCV RBC AUTO: 90.7 FL
MONOCYTES # BLD AUTO: 1.44 X10(3) UL (ref 0.1–1)
MONOCYTES NFR BLD AUTO: 10.6 %
NEUTROPHILS # BLD AUTO: 11.06 X10 (3) UL (ref 1.5–7.7)
NEUTROPHILS # BLD AUTO: 11.06 X10(3) UL (ref 1.5–7.7)
NEUTROPHILS NFR BLD AUTO: 81.7 %
P AXIS: 44 DEGREES
P-R INTERVAL: 144 MS
PLATELET # BLD AUTO: 152 10(3)UL (ref 150–450)
POTASSIUM SERPL-SCNC: 5.1 MMOL/L (ref 3.5–5.1)
Q-T INTERVAL: 318 MS
QRS DURATION: 92 MS
QTC CALCULATION (BEZET): 432 MS
R AXIS: -25 DEGREES
RBC # BLD AUTO: 4.93 X10(6)UL
SODIUM SERPL-SCNC: 142 MMOL/L (ref 136–145)
T AXIS: 23 DEGREES
VENTRICULAR RATE: 111 BPM
WBC # BLD AUTO: 13.6 X10(3) UL (ref 4–11)

## 2025-03-19 PROCEDURE — 93005 ELECTROCARDIOGRAM TRACING: CPT

## 2025-03-19 PROCEDURE — 80048 BASIC METABOLIC PNL TOTAL CA: CPT | Performed by: PHYSICIAN ASSISTANT

## 2025-03-19 PROCEDURE — 71045 X-RAY EXAM CHEST 1 VIEW: CPT | Performed by: THORACIC SURGERY (CARDIOTHORACIC VASCULAR SURGERY)

## 2025-03-19 PROCEDURE — 93010 ELECTROCARDIOGRAM REPORT: CPT | Performed by: INTERNAL MEDICINE

## 2025-03-19 PROCEDURE — 97530 THERAPEUTIC ACTIVITIES: CPT

## 2025-03-19 PROCEDURE — 82962 GLUCOSE BLOOD TEST: CPT

## 2025-03-19 PROCEDURE — 71045 X-RAY EXAM CHEST 1 VIEW: CPT | Performed by: PHYSICIAN ASSISTANT

## 2025-03-19 PROCEDURE — 83735 ASSAY OF MAGNESIUM: CPT | Performed by: PHYSICIAN ASSISTANT

## 2025-03-19 PROCEDURE — 85025 COMPLETE CBC W/AUTO DIFF WBC: CPT | Performed by: PHYSICIAN ASSISTANT

## 2025-03-19 PROCEDURE — 97165 OT EVAL LOW COMPLEX 30 MIN: CPT

## 2025-03-19 PROCEDURE — 97161 PT EVAL LOW COMPLEX 20 MIN: CPT

## 2025-03-19 PROCEDURE — 97116 GAIT TRAINING THERAPY: CPT

## 2025-03-19 RX ORDER — HYDROCODONE BITARTRATE AND ACETAMINOPHEN 5; 325 MG/1; MG/1
2 TABLET ORAL EVERY 6 HOURS PRN
Status: DISCONTINUED | OUTPATIENT
Start: 2025-03-19 | End: 2025-03-22

## 2025-03-19 RX ORDER — HYDROCODONE BITARTRATE AND ACETAMINOPHEN 5; 325 MG/1; MG/1
1-2 TABLET ORAL EVERY 4 HOURS PRN
Status: DISCONTINUED | OUTPATIENT
Start: 2025-03-19 | End: 2025-03-19 | Stop reason: SDUPTHER

## 2025-03-19 RX ORDER — HYDROCODONE BITARTRATE AND ACETAMINOPHEN 5; 325 MG/1; MG/1
1 TABLET ORAL EVERY 4 HOURS PRN
Status: DISCONTINUED | OUTPATIENT
Start: 2025-03-19 | End: 2025-03-22

## 2025-03-19 RX ORDER — SODIUM CHLORIDE 9 MG/ML
INJECTION, SOLUTION INTRAVENOUS CONTINUOUS
Status: ACTIVE | OUTPATIENT
Start: 2025-03-19 | End: 2025-03-19

## 2025-03-19 RX ORDER — FUROSEMIDE 10 MG/ML
20 INJECTION INTRAMUSCULAR; INTRAVENOUS
Status: COMPLETED | OUTPATIENT
Start: 2025-03-19 | End: 2025-03-20

## 2025-03-19 NOTE — PROGRESS NOTES
CC: follow-up hospital admission cabg    SUBJECTIVE:  Interval History:     No acute issues overnight  Lines removed    OBJECTIVE:  Scheduled Meds:    furosemide  20 mg Intravenous BID (Diuretic)    insulin aspart  2-10 Units Subcutaneous TID AC and HS    aspirin  81 mg Oral Daily    rosuvastatin  10 mg Oral Nightly    sennosides  8.6 mg Oral BID    docusate sodium  100 mg Oral BID    metoprolol tartrate  12.5 mg Oral 2x Daily(Beta Blocker)    mupirocin  1 Application Nasal BID    pantoprazole  40 mg Intravenous QAM AC    Or    pantoprazole  40 mg Oral QAM AC    ceFAZolin  2 g Intravenous Q8H     Continuous Infusions:    sodium chloride 100 mL/hr at 03/19/25 0621    propofol Stopped (03/18/25 1602)    dexmedetomidine Stopped (03/18/25 1730)    sodium chloride 10 mL/hr at 03/19/25 0600    DOBUTamine Stopped (03/19/25 0100)    nitroGLYCERIN in dextrose 5% Stopped (03/18/25 1755)    norepinephrine      NitroPRUSSide (Nipride) 50 mg in dextrose 5% 250 mL infusion      dextrose 5%-sodium chloride 0.45% 80 mL/hr (03/19/25 0600)    sodium chloride 10 mL/hr at 03/19/25 0600    HYDROmorphone in sodium chloride 0.9%      insulin regular Stopped (03/19/25 0700)     PRN Meds:   HYDROcodone-acetaminophen **OR** HYDROcodone-acetaminophen    midazolam    acetaminophen    melatonin    polyethylene glycol (PEG 3350)    bisacodyl    ondansetron    albumin human    DOBUTamine    nitroGLYCERIN in dextrose 5%    norepinephrine    NitroPRUSSide (Nipride) 50 mg in dextrose 5% 250 mL infusion    potassium chloride **OR** potassium chloride    calcium gluconate    magnesium sulfate in dextrose 5%    magnesium sulfate in sterile water for injection    morphINE **OR** morphINE    naloxone    diphenhydrAMINE    glucose **OR** glucose **OR** glucose-vitamin C **OR** dextrose **OR** glucose **OR** glucose **OR** glucose-vitamin C    PHYSICAL EXAM  Vital signs: Temp:  [96.8 °F (36 °C)-98.7 °F (37.1 °C)] 98.3 °F (36.8 °C)  Pulse:  [101-123]  107  Resp:  [12-30] 27  BP: ()/(67-99) 115/92  SpO2:  [95 %-99 %] 96 %  AO: (-3-148)/(-11-91) 119/76  FiO2 (%):  [40 %-60 %] 40 %      GENERAL - NAD   EYES- sclera anicteric   HENT- normocephalic,   NECK - no JVD  CV- RRR  RESP -   normal resp effort  ABDOMEN- soft, NT/ND   EXT- mild LE edema,        Data Review:   Labs:   Recent Labs   Lab 03/18/25  1433 03/19/25  0415   WBC 11.0 13.6*   HGB 15.3 15.1   MCV 89.4 90.7   .0* 152.0   INR 1.12  --        Recent Labs   Lab 03/18/25  1433 03/19/25  0415    142   K 4.4 5.1    108   CO2 28.0 27.0   BUN 17 18   CREATSERUM 1.14 1.20   CA 8.6* 8.5*   MG 2.4 2.1   * 145*       No results for input(s): \"ALT\", \"AST\", \"ALB\", \"AMYLASE\", \"LIPASE\", \"LDH\" in the last 168 hours.    Invalid input(s): \"ALPHOS\", \"TBIL\", \"DBIL\", \"TPROT\"    Recent Labs   Lab 03/19/25  0306 03/19/25  0414 03/19/25  0504 03/19/25  0608 03/19/25  0700   PGLU 131* 141* 135* 132* 131*           ASSESSMENT/PLAN:  Patient is a 59 year old male with PMH sig for hld, predm here for scheduled CABG     Impression     -mv CAD sp CABG 03/18/25  -post op pain     -HLD  -predm, A1c 6,1     Plan     -post op [facility] care per [facility]   On aspirin, bb, statin,   -iv lasix     -iss prn           Will continue to follow while hospitalized. Please page me or the on-call hospitalist with questions or concerns.    Pk Owens Hospitalist  [phone]  Answering Service: [phone]

## 2025-03-19 NOTE — OCCUPATIONAL THERAPY NOTE
OCCUPATIONAL THERAPY EVALUATION - INPATIENT     Room Number: 6604/6604-A  Evaluation Date: 3/19/2025  Type of Evaluation: Initial  Presenting Problem: s/p 3/18 CABG x 4 using left internal mammary artery and endoscopic vein harvest from left leg, BUCKY    Physician Order: IP Consult to Occupational Therapy  Reason for Therapy: ADL/IADL Dysfunction and Discharge Planning    OCCUPATIONAL THERAPY ASSESSMENT   Patient is currently functioning below baseline with toileting, lower body dressing, bed mobility, transfers, energy conservation strategies, and aerobic capacity. Prior to admission, patient's baseline is IND.  Patient is requiring supervision as a result of the following impairments: decreased endurance, decreased muscular endurance, medical status, and increased O2 needs from baseline. Occupational Therapy will continue to follow for duration of hospitalization.    Patient will benefit from continued skilled OT Services for duration of hospitalization, however, given the patient is functioning near baseline level do not anticipate skilled therapy needs at discharge     History Related to Current Admission: Patient is a 59 year old male admitted on 3/18/2025 with Presenting Problem: s/p 3/18 CABG x 4 using left internal mammary artery and endoscopic vein harvest from left leg, BUCKY. Co-Morbidities : coronary atherosclerosis, HLD, elevated LDH, HTN, prediabetes    WEIGHT BEARING RESTRICTION   None      EVALUATION SESSION:  Patient Start of Session: Semi supine in bed     FUNCTIONAL TRANSFER ASSESSMENT  Sit to Stand: Edge of Bed  Edge of Bed: Contact Guard Assist    BED MOBILITY  Rolling: Supervision  Supine to Sit : Supervision  Scooting: Supervision    BALANCE ASSESSMENT  Static Sitting: Independent  Static Standing: Supervision    FUNCTIONAL ADL ASSESSMENT     ACTIVITY TOLERANCE: Fair. Pt sob with ambulation and sweating.   Pulse: 101  Heart Rate Source: Monitor     BP: 120/73  BP Location: Right arm  BP Method:  Automatic  Patient Position: Sitting    O2 SATURATIONS  Oxygen Therapy  SPO2% on Oxygen at Rest: 96  At rest oxygen flow (liters per minute): 3    COGNITION  Overall Cognitive Status:  WFL - within functional limits    Upper Extremity   ROM: within functional limits   Strength: within functional limits while maintaining precautions     Coordination  Gross motor: intact  Fine motor: intact     EDUCATION PROVIDED  Patient Education : Role of Occupational Therapy; Functional Transfer Techniques; Surgical Precautions; Proper Body Mechanics; Plan of Care  Patient's Response to Education: Verbalized Understanding; Returned Demonstration  Family/Caregiver Education : Role of Occupational Therapy; Plan of Care  Family/Caregiver's Response to Education: Verbalized Understanding    Equipment used: RW  Demonstrates functional use     Therapist comments: Pt educated on role of OT as part of rehab team and plan of care. Pt educated on sternal precautions and proper body mechanics for transfers. Pt cued for hand placement and log roll technique for bed mobility. Pt completed sit to stand transfer from EOB with CGA in preparation for toilet transfers and LE dressing. Pt cued for use of breathing techniques to alleviate sob.       Patient End of Session: Up in chair, Needs met, Call light within reach, RN aware of session/findings, All patient questions and concerns addressed, Hospital anti-slip socks, Family present    OCCUPATIONAL PROFILE    HOME SITUATION  Type of Home: House  Home Layout: Multi-level  Lives With: Spouse, Son, Daughter    Toilet and Equipment: Standard height toilet  Shower/Tub and Equipment: Walk-in shower       Occupation/Status: Works as      Drives: Yes  Patient Regularly Uses: Reading glasses    Prior Level of Function: Independent with ADLs and IADLs. Working as . Enjoys golfing.     SUBJECTIVE   \"Are we not walking further?\"     PAIN ASSESSMENT  Rating: Unable to rate  Location: Pt does  not report pain       OBJECTIVE  Precautions: Sternal, Cardiac  Fall Risk: Standard fall risk    ASSESSMENTS    AM-PAC ‘6-Clicks’ Inpatient Daily Activity Short Form  -   Putting on and taking off regular lower body clothing?: A Little  -   Bathing (including washing, rinsing, drying)?: A Little  -   Toileting, which includes using toilet, bedpan or urinal? : A Little  -   Putting on and taking off regular upper body clothing?: None  -   Taking care of personal grooming such as brushing teeth?: None  -   Eating meals?: None    AM-PAC Score:  Score: 21  Approx Degree of Impairment: 32.79%  Standardized Score (AM-PAC Scale): 44.27    ADDITIONAL TESTS     NEUROLOGICAL FINDINGS      COGNITION ASSESSMENTS     PLAN     OT Treatment Plan: Energy conservation/work simplification techniques, UE strengthening/ROM, Patient/Family education  Rehab Potential : Good  Frequency: 3-5x/week  Number of Visits to Meet Established Goals: 3    ADL Goals   Patient will perform lower body dressing:  with modified independent  Patient will perform toileting: with supervision    Functional Transfer Goals  Patient will transfer from sit to supine:  with supervision  Patient will transfer from supine to sit:  with supervision  Patient will transfer to toilet:  with supervision    UE Exercise Program Goal  Patient will be independent with bilateral AROM HEP (home exercise program).    Additional Goals  Pt will verbalize two energy conservation techniques to use with ADLs.     Therapist Goals  Introduce CVOR UE HEP and energy conservation     Patient Evaluation Complexity Level:   Occupational Profile/Medical History LOW - Brief history including review of medical or therapy records    Specific performance deficits impacting engagement in ADL/IADL LOW  1 - 3 performance deficits    Client Assessment/Performance Deficits LOW - No comorbidities nor modifications of tasks    Clinical Decision Making LOW - Analysis of occupational profile,  problem-focused assessments, limited treatment options    Overall Complexity LOW     OT Session Time: 15 minutes  Therapeutic Activity: 10 minutes

## 2025-03-19 NOTE — PROGRESS NOTES
[facility] Cardiology Progress Note        [facility] Patient Status:  Inpatient    4/15/1965 MRN [id number]   [facility] 6NE-A Attending Masoud Horn MD   Hosp Day # 1 PCP Ida Kebede MD     Subjective:  The patient denies  chest pain and shortness of breath.  extubated    Medications:   furosemide  20 mg Intravenous BID (Diuretic)    insulin aspart  2-10 Units Subcutaneous TID AC and HS    aspirin  81 mg Oral Daily    rosuvastatin  10 mg Oral Nightly    sennosides  8.6 mg Oral BID    docusate sodium  100 mg Oral BID    metoprolol tartrate  12.5 mg Oral 2x Daily(Beta Blocker)    mupirocin  1 Application Nasal BID    pantoprazole  40 mg Intravenous QAM AC    Or    pantoprazole  40 mg Oral QAM AC    ceFAZolin  2 g Intravenous Q8H       Continuous Infusions:   dexmedetomidine Stopped (25 1730)    sodium chloride 10 mL/hr at 25 0600    DOBUTamine Stopped (25 0100)    nitroGLYCERIN in dextrose 5% Stopped (25 1755)    norepinephrine      NitroPRUSSide (Nipride) 50 mg in dextrose 5% 250 mL infusion      dextrose 5%-sodium chloride 0.45% 80 mL/hr (25 0600)    sodium chloride 10 mL/hr at 25 0600    HYDROmorphone in sodium chloride 0.9%      insulin regular Stopped (25 0700)         Allergies:  Allergies[1]      Objective:        Intake/Output:      Intake/Output Summary (Last 24 hours) at 3/19/2025 1302  Last data filed at 3/19/2025 1200  Gross per 24 hour   Intake 2500.7 ml   Output 2795 ml   Net -294.3 ml     Wt Readings from Last 3 Encounters:   25 234 lb 9.1 oz (106.4 kg)   25 235 lb (106.6 kg)   22 223 lb (101.2 kg)       Physical Exam:        Vitals:    25 1000 25 1100 25 1116 25 1200   BP: 116/85 117/77 120/73 121/87   BP Location:   Right arm Right arm   Pulse: 100 100  101   Resp:    Temp:    98.4 °F (36.9 °C)   TempSrc:    Temporal   SpO2: 96% 96%  93%   Weight:       Height:            Temp:  [96.8 °F (36 °C)-98.7 °F (37.1 °C)] 98.4 °F (36.9 °C)  Pulse:  [100-123] 101  Resp:  [12-30] 22  BP: ()/(67-99) 121/87  SpO2:  [93 %-99 %] 93 %  AO: (-3-148)/(-11-91) 119/76  FiO2 (%):  [40 %-60 %] 40 %      Temp: 98.4 °F (36.9 °C)  Pulse: 101  Resp: 22  BP: 121/87  AO: 119/76  FiO2 (%): 40 %  General:  Appears comfortable  HEENT: No focal deficits.  Neck: No JVD, carotids 2+ no bruits.  Cardiac: Regular S1S2.  No S3, S4, rub, click.  No murmur.  Lungs: Clear to auscultation and percussion.  Abdomen: Soft, non-tender.   Extremities: No LE edema.  No clubbing or cyanosis.    Neurologic: Alert and oriented, normal affect.  Skin: Warm and dry.           LABS:      HEM:  Recent Labs   Lab 03/18/25  1433 03/19/25  0415   WBC 11.0 13.6*   HGB 15.3 15.1   HCT 44.5 44.7   .0* 152.0       Chem:  Recent Labs   Lab 03/18/25  1433 03/19/25  0415    142   K 4.4 5.1    108   CO2 28.0 27.0   BUN 17 18   CREATSERUM 1.14 1.20   CA 8.6* 8.5*   MG 2.4 2.1   * 145*       No results for input(s): \"ALT\", \"AST\", \"ALB\", \"AMYLASE\", \"LIPASE\", \"LDH\" in the last 168 hours.    Invalid input(s): \"ALPHOS\", \"TBIL\", \"DBIL\", \"TPROT\"    Recent Labs   Lab 03/18/25  1433   PTT 28.0   INR 1.12           No results found for: \"TROP\", \"CKMB\"      Invalid input(s): \"PBNPML\"                       Diagnostics:        Telemetry: SR/ST. 100/min     Laboratories and Data:  Diagnostics:     EKG, 3/18/2025:  ST, 110/min, LAD     CXR, 3/18/2025:                Impression:     1.  CAD.  Multivessel disease. Worst in LAD/Diagonal distribution, s/p 4 vessel CABG to LAD, diagonal, OM, PDA 3/18/25.  Stable hemodynamics and rhythm     2. Diabetes     3. Dyslipidemia      4. Tobacco use     Recommend:     Discussed smoking cessation, Diabetes control, statin Rx  CCU today  Lasix, BB  Statin, asa    Nolberto Guerra MD  3/19/2025  1:02 PM           [1]   Allergies  Allergen Reactions    Pravastatin SWELLING, FATIGUE  and OTHER (SEE COMMENTS)     And tingling

## 2025-03-19 NOTE — PLAN OF CARE
Received pt up in chair. After breakfast, back to bed, de-lined everything except pacer wires. Chest xray completed post chest tube removal. Pain control with dilaudid PCA to start shift, transitioned to PO norco, see MAR. Ambulating halls this afternoon with ease. VSS on 3L nasal cannula, NSR-ST on tele. Family at bedside, updated and participating in plan of care.

## 2025-03-19 NOTE — PHYSICAL THERAPY NOTE
PHYSICAL THERAPY EVALUATION - INPATIENT     Room Number: 6604/6604-A  Evaluation Date: 3/19/2025  Type of Evaluation: Initial  Physician Order: PT Eval and Treat    Presenting Problem: s/p CORONARY ARTERY BYPASS GRAFT X4 USING LEFT INTERNAL MAMMARY ARTERY AND ENDOSCOPIC VEIN HARVEST FROM THE LEFT LEG, AND INTRAOPERATIVE TRANSESOPHAGEAL ECHOCARDIOGRAM    3/18  Co-Morbidities : coronary atherosclerosis, HLD, elevated LDH, HTN, prediabetes  Reason for Therapy: Mobility Dysfunction and Discharge Planning    PHYSICAL THERAPY ASSESSMENT   Patient is a 59 year old male admitted 3/18/2025 for planned procedure s/p CABGx4.  Prior to admission, patient's baseline is indep.  Patient is currently functioning below baseline with transfers, gait, and stair negotiation.  Patient is requiring contact guard assist as a result of the following impairments: decreased functional strength, decreased endurance/aerobic capacity, pain, medical status, and increased O2 needs from baseline.  Physical Therapy will continue to follow for duration of hospitalization.    Patient will benefit from continued skilled PT Services for duration of hospitalization, however, given the patient is functioning near baseline level do not anticipate skilled therapy needs at discharge .    PLAN DURING HOSPITALIZATION  Nursing Mobility Recommendation : 1 Assist  PT Device Recommendation: Rolling walker  PT Treatment Plan: Bed mobility, Body mechanics, Coordination, Endurance, Energy conservation, Patient education, Family education, Gait training, Neuromuscular re-educate, Range of motion, Strengthening, Stoop training, Stair training, Transfer training, Balance training  Rehab Potential : Good  Frequency (Obs): 3-5x/week     CURRENT GOALS    Goal #1 Patient is able to demonstrate supine - sit EOB @ level: supervision     Goal #2 Patient is able to demonstrate transfers EOB to/from Chair/Wheelchair at assistance level: supervision     Goal #3 Patient is able  to ambulate 150 feet with assist device: walker - rolling at assistance level: supervision     Goal #4 Patient is able to navigate stairs with rail and SBA   Goal #5 Patient is able to participate in CV binder HEP   Goal #6    Goal Comments: Goals established on 3/19/2025      PHYSICAL THERAPY MEDICAL/SOCIAL HISTORY  History related to current admission: Patient is a 59 year old male admitted on 3/18/2025 from home for planned procedure s/p CABGx4.    HOME SITUATION  Type of Home: House  Home Layout: Multi-level                     Lives With: Spouse, Son, Daughter    Drives: Yes   Patient Regularly Uses: Reading glasses      Prior Level of [place]: Indep, enjoys swimming and olive picking     SUBJECTIVE  \" I like to golf\"     OBJECTIVE  Precautions: Sternal, Cardiac  Fall Risk: Standard fall risk    WEIGHT BEARING RESTRICTION     PAIN ASSESSMENT  Rating: Unable to rate  Location: post op incision  Management Techniques: Activity promotion, Body mechanics, Repositioning    COGNITION  Overall Cognitive Status:  WFL - within functional limits    RANGE OF MOTION AND STRENGTH ASSESSMENT  Upper extremity ROM and strength are within functional limits     Lower extremity ROM is within functional limits     Lower extremity strength is within functional limits     BALANCE  Static Sitting: Good  Dynamic Sitting: Good  Static Standing: Fair -  Dynamic Standing: Fair -    ADDITIONAL TESTS                                    ACTIVITY TOLERANCE  Pulse:  (low 100s)  Heart Rate Source: Monitor     BP: 120/73  BP Location: Right arm  BP Method: Automatic  Patient Position: Sitting    O2 WALK  Oxygen Therapy  SPO2% on Oxygen at Rest: 96  At rest oxygen flow (liters per minute): 3    NEUROLOGICAL FINDINGS                        AM-PAC '6-Clicks' INPATIENT SHORT FORM - BASIC MOBILITY  How much difficulty does the patient currently have...  Patient Difficulty: Turning over in bed (including adjusting bedclothes, sheets and  blankets)?: None   Patient Difficulty: Sitting down on and standing up from a chair with arms (e.g., wheelchair, bedside commode, etc.): A Little   Patient Difficulty: Moving from lying on back to sitting on the side of the bed?: None   How much help from another person does the patient currently need...   Help from Another: Moving to and from a bed to a chair (including a wheelchair)?: A Little   Help from Another: Need to walk in hospital room?: A Little   Help from Another: Climbing 3-5 steps with a railing?: A Little     AM-PAC Score:  Raw Score: 20   Approx Degree of Impairment: 35.83%   Standardized Score (AM-PAC Scale): 47.67   CMS Modifier (G-Code): CJ    FUNCTIONAL ABILITY STATUS  Gait Assessment   Functional Mobility/Gait Assessment  Gait Assistance: Contact guard assist  Distance (ft): 30  Assistive Device: Rolling walker  Pattern: Shuffle    Skilled Therapy Provided     Bed Mobility:  Rolling: mod I   Supine to sit: CGA   Sit to supine: NT     Transfer Mobility:  Sit to stand: CGA   Stand to sit: CGA  Gait = CGA    Therapist's Comments: Discussed case with RN prior to session initiation. Pt agreeable to participation in therapy. Gait belt donned for out of bed mobility. Pt educated on role of therapy throughout stay, post op precautions with cuing for appropriate body mechanics during bed mobility (log roll technique) and UE placement during STS transfer to RW. Pt participated in gait training with RW. Educated on EC and pacing techniques and importance of continued ambulation for recovery per post op protocol. Pt tolerated session well HR low 100s with activity. Mildly diaphoretic with ambulation.        Exercise/Education Provided:  Bed mobility  Body mechanics  Energy conservation  Functional activity tolerated  Gait training  Transfer training    Patient End of Session: Up in chair, Needs met, Call light within reach, Bracing education provided per handout, All patient questions and concerns  addressed, Hospital anti-slip socks      Patient Evaluation Complexity Level:  History Low - no personal factors and/or co-morbidities   Examination of body systems Low -  addressing 1-2 elements   Clinical Presentation Low- Stable   Clinical Decision Making Low Complexity       PT Session Time: 23 minutes  Gait Training: 10  minutes  Therapeutic Activity:  minutes  Neuromuscular Re-education:  minutes  Therapeutic Exercise:  minutes

## 2025-03-19 NOTE — PROGRESS NOTES
Seen and examined by Dr. Horn POD #1 s/p CABG  Delined, CT out, PW remain in, walking in halls. NSR.   Lasix 20 BID, transition PCA to norco, adequate pain management.   Keep in CCU one more day, discharge Friday or Saturday with current course. Pt and wife understand.  Geeta Alvares RN  Clinical Coordinator  CV Surgery

## 2025-03-19 NOTE — PLAN OF CARE
Assumed care of pt. At 1930. Pt. Drowsy, but oriented x4. VSS - Sinus tach. Dobutamine weaned off as able. Usual lines. Dilaudid PCA. Up to chair this AM w/ minimal assist. Tolerating PO intake. Will continue to monitor closely.

## 2025-03-20 LAB
ANION GAP SERPL CALC-SCNC: 7 MMOL/L (ref 0–18)
BASE EXCESS BLDV CALC-SCNC: -2 MMOL/L (ref ?–30)
BASOPHILS # BLD AUTO: 0.03 X10(3) UL (ref 0–0.2)
BASOPHILS NFR BLD AUTO: 0.2 %
BUN BLD-MCNC: 16 MG/DL (ref 9–23)
CA-I BLDV-SCNC: 1.26 MMOL/L (ref 1.12–1.32)
CALCIUM BLD-MCNC: 8.1 MG/DL (ref 8.7–10.6)
CHLORIDE SERPL-SCNC: 101 MMOL/L (ref 98–112)
CO2 BLDV-SCNC: 26 MMOL/L (ref 22–32)
CO2 SERPL-SCNC: 30 MMOL/L (ref 21–32)
CREAT BLD-MCNC: 1.17 MG/DL
EGFRCR SERPLBLD CKD-EPI 2021: 71 ML/MIN/1.73M2 (ref 60–?)
EOSINOPHIL # BLD AUTO: 0.01 X10(3) UL (ref 0–0.7)
EOSINOPHIL NFR BLD AUTO: 0.1 %
ERYTHROCYTE [DISTWIDTH] IN BLOOD BY AUTOMATED COUNT: 13 %
GLUCOSE BLD-MCNC: 126 MG/DL (ref 70–99)
GLUCOSE BLD-MCNC: 165 MG/DL (ref 70–99)
GLUCOSE BLD-MCNC: 174 MG/DL (ref 70–99)
GLUCOSE BLD-MCNC: 196 MG/DL (ref 70–99)
GLUCOSE BLD-MCNC: 231 MG/DL (ref 70–99)
GLUCOSE BLDV-MCNC: 183 MG/DL (ref 70–99)
HCO3 BLDA-SCNC: 24.5 MEQ/L (ref 22–26)
HCT VFR BLD AUTO: 40.4 %
HCT VFR BLDV CALC: 38 %
HGB BLD-MCNC: 13.6 G/DL
IMM GRANULOCYTES # BLD AUTO: 0.06 X10(3) UL (ref 0–1)
IMM GRANULOCYTES NFR BLD: 0.5 %
LYMPHOCYTES # BLD AUTO: 1.88 X10(3) UL (ref 1–4)
LYMPHOCYTES NFR BLD AUTO: 15.6 %
MCH RBC QN AUTO: 30.7 PG (ref 26–34)
MCHC RBC AUTO-ENTMCNC: 33.7 G/DL (ref 31–37)
MCV RBC AUTO: 91.2 FL
MONOCYTES # BLD AUTO: 1.27 X10(3) UL (ref 0.1–1)
MONOCYTES NFR BLD AUTO: 10.5 %
NEUTROPHILS # BLD AUTO: 8.83 X10 (3) UL (ref 1.5–7.7)
NEUTROPHILS # BLD AUTO: 8.83 X10(3) UL (ref 1.5–7.7)
NEUTROPHILS NFR BLD AUTO: 73.1 %
OSMOLALITY SERPL CALC.SUM OF ELEC: 291 MOSM/KG (ref 275–295)
PCO2 BLDV: 50.6 MMHG (ref 38–50)
PH BLDV: 7.29 [PH] (ref 7.32–7.43)
PLATELET # BLD AUTO: 112 10(3)UL (ref 150–450)
PO2 BLDV: <70 MMHG (ref 35–40)
POTASSIUM SERPL-SCNC: 4.3 MMOL/L (ref 3.5–5.1)
RBC # BLD AUTO: 4.43 X10(6)UL
SAO2 % BLDV: 73 % (ref 60–85)
SODIUM BLDV-SCNC: 139 MMOL/L (ref 136–145)
SODIUM BLDV-SCNC: 4.9 MMOL/L (ref 3.6–5.1)
SODIUM SERPL-SCNC: 138 MMOL/L (ref 136–145)
WBC # BLD AUTO: 12.1 X10(3) UL (ref 4–11)

## 2025-03-20 PROCEDURE — 80048 BASIC METABOLIC PNL TOTAL CA: CPT | Performed by: PHYSICIAN ASSISTANT

## 2025-03-20 PROCEDURE — 85025 COMPLETE CBC W/AUTO DIFF WBC: CPT | Performed by: THORACIC SURGERY (CARDIOTHORACIC VASCULAR SURGERY)

## 2025-03-20 PROCEDURE — 94760 N-INVAS EAR/PLS OXIMETRY 1: CPT

## 2025-03-20 PROCEDURE — 99211 OFF/OP EST MAY X REQ PHY/QHP: CPT

## 2025-03-20 PROCEDURE — 82962 GLUCOSE BLOOD TEST: CPT

## 2025-03-20 RX ORDER — METOPROLOL TARTRATE 50 MG
50 TABLET ORAL
Status: DISCONTINUED | OUTPATIENT
Start: 2025-03-20 | End: 2025-03-22

## 2025-03-20 RX ORDER — METOPROLOL TARTRATE 25 MG/1
25 TABLET, FILM COATED ORAL
Status: DISCONTINUED | OUTPATIENT
Start: 2025-03-20 | End: 2025-03-20

## 2025-03-20 NOTE — PLAN OF CARE
Assumed care of patient this am. Vitals stable, NSR- ST, RA or 1L/NC while sleeping. IS use and cough/deep breathing encouraged. Patient ambulating with standby assist. Incisions C/D/I. PRN Norco given for incisional pain.  PW removed per order at 0940. Site remains clean, dry, and intact, no bleeding, no pain. Wires intact, patient resting in bed.

## 2025-03-20 NOTE — BH PROGRESS NOTE
1300-Received Pt from CNICU per bed, accompanied per staff and wife. Pt oriented to unit, room and call light. Pt is A&O X4, on RA, SR per tele. Voiced no c/o`s at present. Poc updated, mobilize, encourage IS, pain control. Cpm.

## 2025-03-20 NOTE — H&P
RE:  Gosia SINGER  :  04/15/1965     Mr. Singer is a very pleasant 59-year-old gentleman who really is not having cardiac symptoms.  Notwithstanding, he has known coronary disease by virtue of prior coronary calcium studies done several times over the last number of years with a rising count.  The most recent study demonstrated a total calcium score just over 400.  He underwent a stress test with Doctor Javy Dye which really didn't show too much.  He subsequently CT FFR which did show restrictive flow in the right and LAD.       As such, the patient has now undergone catheterization which demonstrates clearly there is a high-grade lesion in the proximal right posterior descending artery.  He has distal left main coronary stenosis prior to the LAD which I would make at least 70% if not 80%.  And, this disease runs over a long segment including the takeoff of a substantial diagonal.  There appears to me to be some narrowing at the origin of the circumflex though I can't exactly sort out exactly how severe that is.  Left ventricular function is preserved.  There is no valvular pathology.  The rhythm is sinus.       Past medical history is notable for borderline diabetes on metformin with a hemoglobin A1c of 6.1.  He has dyslipidemia.  He has been maintained on a statin.  There is no history of stroke, cancer, TB, or ulcer disease.  Past surgical history is notable for L5-S1 lumbar spine surgery with a good result.  He is an occasional smoker of cigars but not cigarettes.  Family history is quite notable in that his father had a large heart attack at age 48 and subsequently underwent coronary bypass surgery.      On review of systems, the patient has no complaints of blurred vision or hearing problems.  The patient denies palpitations, wheezing, syncope, or dizziness.  There are no symptoms of GI reflux, urgency or frequency of urination, or rashes.  The patient has normal mobility.  The patient denies any  history of bleeding disorders.     Physical examination shows a gentleman who looks pretty healthy lying supine.  Pupils are equal and round.  Carotid pulses are equal bilaterally.  There are no bruits.  The lungs are clear without rales or wheezing.  Cardiac examination shows a regular rhythm.  S1 and S2 are normal.  There is no murmur.  There is no S3.  Examination of the extremities shows no cyanosis, clubbing, or edema.  Saphenous vein is visible.       We have, today, discussed the nature of the patient's cardiovascular condition that being severe multivessel coronary artery disease with flow limiting lesions per CT FFR.  We have discussed options for treatment including continued observation, multivessel percutaneous treatment, or surgical correction.  It would be my opinion that based on his relatively young age and good health with the associated high-grade lesion of the right and the left main coronary that he would benefit from treatment, specifically coronary bypass surgery which can be carried out at a risk under 1%.  The risks, benefits, and options were discussed in detail.  The patient and his wife will think it over though I think they are leaning toward surgical correction which I believe is his best choice.       Thank you for allowing us to see this patient in consultation.     Sincerely,    Masoud Horn M.D.  GARCIA[facility]

## 2025-03-20 NOTE — CARDIAC REHAB
Cardiac Rehab RN saw patient and spouse for education post CABG.  Education initiated.  Patient has surgical binder.  Phase 2 appointment made.

## 2025-03-20 NOTE — PROGRESS NOTES
[facility] Cardiology Progress Note        [facility] Patient Status:  Inpatient    4/15/1965 MRN [id number]   [facility] 6NE-A Attending Masoud Horn MD   Hosp Day # 2 PCP Ida Kebede MD     Subjective:  The patient denies  chest pain and shortness of breath.  ambulating    Medications:   metoprolol tartrate  25 mg Oral 2x Daily(Beta Blocker)    furosemide  20 mg Intravenous BID (Diuretic)    insulin aspart  2-10 Units Subcutaneous TID AC and HS    aspirin  81 mg Oral Daily    rosuvastatin  10 mg Oral Nightly    sennosides  8.6 mg Oral BID    docusate sodium  100 mg Oral BID    mupirocin  1 Application Nasal BID    pantoprazole  40 mg Oral QAM AC       Continuous Infusions:          Allergies:  Allergies[1]      Objective:        Intake/Output:      Intake/Output Summary (Last 24 hours) at 3/20/2025 1256  Last data filed at 3/20/2025 1200  Gross per 24 hour   Intake 1941 ml   Output 3450 ml   Net -1509 ml     Wt Readings from Last 3 Encounters:   25 235 lb 1.6 oz (106.6 kg)   25 235 lb (106.6 kg)   22 223 lb (101.2 kg)       Physical Exam:        Vitals:    25 0900 25 1000 25 1100 25 1200   BP: 113/79 125/73 126/76 131/83   BP Location:    Left arm   Pulse: 108 107 109 108   Resp: (!) 27 (!) 28 (!) 29 21   Temp:    98.4 °F (36.9 °C)   TempSrc:    Temporal   SpO2: 92% 93% 92% 95%   Weight:       Height:           Temp:  [97 °F (36.1 °C)-99.6 °F (37.6 °C)] 98.4 °F (36.9 °C)  Pulse:  [] 108  Resp:  [12-30] 21  BP: (104-146)/(70-86) 131/83  SpO2:  [92 %-97 %] 95 %      Temp: 98.4 °F (36.9 °C)  Pulse: 108  Resp: 21  BP: 131/83  General:  Appears comfortable  HEENT: No focal deficits.  Neck: No JVD, carotids 2+ no bruits.  Cardiac: Regular S1S2.  No S3, S4, rub, click.  No murmur.  Lungs: Clear to auscultation and percussion.  Abdomen: Soft, non-tender.   Extremities: No LE edema.  No clubbing or cyanosis.    Neurologic: Alert and  oriented, normal affect.  Skin: Warm and dry.           LABS:      HEM:  Recent Labs   Lab 03/18/25  1433 03/19/25  0415 03/20/25  0336   WBC 11.0 13.6* 12.1*   HGB 15.3 15.1 13.6   HCT 44.5 44.7 40.4   .0* 152.0 112.0*       Chem:  Recent Labs   Lab 03/18/25  1433 03/19/25  0415 03/20/25  0336    142 138   K 4.4 5.1 4.3    108 101   CO2 28.0 27.0 30.0   BUN 17 18 16   CREATSERUM 1.14 1.20 1.17   CA 8.6* 8.5* 8.1*   MG 2.4 2.1  --    * 145* 165*       No results for input(s): \"ALT\", \"AST\", \"ALB\", \"AMYLASE\", \"LIPASE\", \"LDH\" in the last 168 hours.    Invalid input(s): \"ALPHOS\", \"TBIL\", \"DBIL\", \"TPROT\"    Recent Labs   Lab 03/18/25  1433   PTT 28.0   INR 1.12           No results found for: \"TROP\", \"CKMB\"      Invalid input(s): \"PBNPML\"                       Diagnostics:        Telemetry: SR/ST. 100/min     Laboratories and Data:  Diagnostics:     EKG, 3/18/2025:  ST, 110/min, LAD     CXR, 3/18/2025:                Impression:     1.  CAD.  Multivessel disease. Worst in LAD/Diagonal distribution, s/p 4 vessel CABG to LAD, diagonal, OM, PDA 3/18/25.  Stable hemodynamics and rhythm     2. Diabetes     3. Dyslipidemia      4. Tobacco use     Recommend:     Discussed smoking cessation, Diabetes control, statin Rx  telemetry  Lasix  Advance metoprolol to 50 mg bid  Statin, abby Guerra MD           [1]   Allergies  Allergen Reactions    Pravastatin SWELLING, FATIGUE and OTHER (SEE COMMENTS)     And tingling

## 2025-03-20 NOTE — PROGRESS NOTES
CC: follow-up hospital admission cabg    SUBJECTIVE:  Interval History:     Pain overall controlled  No nv  Famiy at bs    OBJECTIVE:  Scheduled Meds:    metoprolol tartrate  25 mg Oral 2x Daily(Beta Blocker)    furosemide  20 mg Intravenous BID (Diuretic)    insulin aspart  2-10 Units Subcutaneous TID AC and HS    aspirin  81 mg Oral Daily    rosuvastatin  10 mg Oral Nightly    sennosides  8.6 mg Oral BID    docusate sodium  100 mg Oral BID    mupirocin  1 Application Nasal BID    pantoprazole  40 mg Oral QAM AC     Continuous Infusions:       PRN Meds:   HYDROcodone-acetaminophen **OR** HYDROcodone-acetaminophen    melatonin    polyethylene glycol (PEG 3350)    bisacodyl    ondansetron    morphINE **OR** morphINE    glucose **OR** glucose **OR** glucose-vitamin C **OR** dextrose **OR** glucose **OR** glucose **OR** glucose-vitamin C    PHYSICAL EXAM  Vital signs: Temp:  [97 °F (36.1 °C)-99.6 °F (37.6 °C)] 97.2 °F (36.2 °C)  Pulse:  [] 107  Resp:  [12-30] 28  BP: (104-146)/(70-87) 125/73  SpO2:  [92 %-97 %] 93 %      GENERAL - NAD   EYES- sclera anicteric   HENT- normocephalic,   NECK - no JVD  CV- RRR  RESP -   normal resp effort  ABDOMEN- soft, NT/ND   EXT- trace LE edema,        Data Review:   Labs:   Recent Labs   Lab 03/18/25  1433 03/19/25  0415 03/20/25  0336   WBC 11.0 13.6* 12.1*   HGB 15.3 15.1 13.6   MCV 89.4 90.7 91.2   .0* 152.0 112.0*   INR 1.12  --   --        Recent Labs   Lab 03/18/25  1433 03/19/25  0415 03/20/25  0336    142 138   K 4.4 5.1 4.3    108 101   CO2 28.0 27.0 30.0   BUN 17 18 16   CREATSERUM 1.14 1.20 1.17   CA 8.6* 8.5* 8.1*   MG 2.4 2.1  --    * 145* 165*       No results for input(s): \"ALT\", \"AST\", \"ALB\", \"AMYLASE\", \"LIPASE\", \"LDH\" in the last 168 hours.    Invalid input(s): \"ALPHOS\", \"TBIL\", \"DBIL\", \"TPROT\"    Recent Labs   Lab 03/19/25  1155 03/19/25  1613 03/19/25  2038 03/20/25  0729 03/20/25  1104   PGLU 171* 164* 182* 174* 196*            ASSESSMENT/PLAN:  Patient is a 59 year old male with PMH sig for hld, predm here for scheduled CABG     Impression     -mv CAD sp CABG 03/18/25  -post op pain     -HLD  -predm, A1c 6,1  Tcp - likely reactive     Plan     -post op surgical care per [facility]   On aspirin, bb, statin,   -iv lasix     -iss prn   Rizwan RN - transferring to floor today        Will continue to follow while hospitalized. Please page me or the on-call hospitalist with questions or concerns.    Pk Owens Hospitalist  [phone]  Answering Service: [phone]

## 2025-03-20 NOTE — PROGRESS NOTES
[facility]   part of [facility]     CV Surgery Progress Note    Gosia Sinegr Patient Status:  Inpatient    4/15/1965 MRN [id number]   Location [facility] 6NE-A Attending Masoud Horn MD   Hosp Day # 2 PCP Ida Kebede MD     Subjective:  Patient sitting in chair, reports feeling good. Pain is controlled. Denies any dyspnea. On 3L NC. Tolerating diet. Passing gas. Ambulating well.     Tele: SR/ST    Objective:  /80   Pulse 99   Temp 97.2 °F (36.2 °C) (Temporal)   Resp 12   Ht 5' 11\" (1.803 m)   Wt 235 lb 1.6 oz (106.6 kg)   SpO2 95%   BMI 32.79 kg/m²     Intake/Output:    Intake/Output Summary (Last 24 hours) at 3/20/2025 0835  Last data filed at 3/20/2025 0426  Gross per 24 hour   Intake 1901 ml   Output 2500 ml   Net -599 ml       Labs:  Lab Results   Component Value Date    WBC 12.1 2025    RBC 4.43 2025    HGB 13.6 2025    HCT 40.4 2025    MCV 91.2 2025    MCH 30.7 2025    MCHC 33.7 2025    RDW 13.0 2025    .0 2025     Lab Results   Component Value Date     2025    K 4.3 2025     2025    CO2 30.0 2025    BUN 16 2025    CREATSERUM 1.17 2025     2025    CA 8.1 2025     Lab Results   Component Value Date    INR 1.12 2025    INR 0.89 03/10/2025    INR 0.93 2022       Physical Exam:  General: VSS, A&Ox3, In NAD  Neck: No JVD  Lungs: diminished at bases   Heart: S1,S2 RRR. Sternum Stable   Abdomen: Soft, non-tender, +bs  Extremities: Warm, dry, +BUE edema  Skin: sternotomy incision C/D/I, LE Incision C/D/I  Neuro: no focal deficits     Assessment/Plan:   S/P CABGx4 with LIMA-LAD, SVG-Diagonal. OM/Ramus, PDA POD#2  -HD stable, off support   -Leukocytosis, low grade temp, likely reactive, down trending- monitor   -TCP, likely 2/2 consumption- monitor   -Mild vol OL, continue IV lasix   -Renal function intact, good UOP  -Bowel regimen   -Pain management,  prn norco   -Chest tubes removed  -Discontinue PW  -GI PPX: protonix   -DVT PPX: TEDs/SCDs  -Encourage IS/ambulation   -PT/OT/Cardiac rehab   -Ok to transfer to CTU     -D/W Dr. Kory Haji, PA-C  Cardiothoracic Surgery   3/20/2025  8:35 AM

## 2025-03-20 NOTE — PLAN OF CARE
Assumed care of pt this PM. In NSR/ ST on the monitor. Pacer wires secured to dressing. On 3L NC. Pain managed overnight with Norco and x1 PRN morphine pushes. Walked the halls this AM. Ambulating well with standby assist. Pacer wires secured to dressing.

## 2025-03-21 PROBLEM — I25.10 CAD (CORONARY ARTERY DISEASE), NATIVE CORONARY ARTERY: Status: ACTIVE | Noted: 2025-03-21

## 2025-03-21 LAB
ANION GAP SERPL CALC-SCNC: 6 MMOL/L (ref 0–18)
BLOOD TYPE BARCODE: 6200
BUN BLD-MCNC: 17 MG/DL (ref 9–23)
CALCIUM BLD-MCNC: 8.7 MG/DL (ref 8.7–10.6)
CHLORIDE SERPL-SCNC: 102 MMOL/L (ref 98–112)
CO2 SERPL-SCNC: 31 MMOL/L (ref 21–32)
CREAT BLD-MCNC: 0.94 MG/DL
EGFRCR SERPLBLD CKD-EPI 2021: 93 ML/MIN/1.73M2 (ref 60–?)
ERYTHROCYTE [DISTWIDTH] IN BLOOD BY AUTOMATED COUNT: 12.9 %
GLUCOSE BLD-MCNC: 112 MG/DL (ref 70–99)
GLUCOSE BLD-MCNC: 117 MG/DL (ref 70–99)
GLUCOSE BLD-MCNC: 125 MG/DL (ref 70–99)
GLUCOSE BLD-MCNC: 134 MG/DL (ref 70–99)
GLUCOSE BLD-MCNC: 157 MG/DL (ref 70–99)
HCT VFR BLD AUTO: 36.9 %
HGB BLD-MCNC: 12.8 G/DL
MCH RBC QN AUTO: 31.2 PG (ref 26–34)
MCHC RBC AUTO-ENTMCNC: 34.7 G/DL (ref 31–37)
MCV RBC AUTO: 90 FL
OSMOLALITY SERPL CALC.SUM OF ELEC: 292 MOSM/KG (ref 275–295)
PLATELET # BLD AUTO: 127 10(3)UL (ref 150–450)
POTASSIUM SERPL-SCNC: 3.8 MMOL/L (ref 3.5–5.1)
RBC # BLD AUTO: 4.1 X10(6)UL
SODIUM SERPL-SCNC: 139 MMOL/L (ref 136–145)
UNIT VOLUME: 350 ML
WBC # BLD AUTO: 9.2 X10(3) UL (ref 4–11)

## 2025-03-21 PROCEDURE — 85027 COMPLETE CBC AUTOMATED: CPT | Performed by: PHYSICIAN ASSISTANT

## 2025-03-21 PROCEDURE — 97110 THERAPEUTIC EXERCISES: CPT

## 2025-03-21 PROCEDURE — 97116 GAIT TRAINING THERAPY: CPT

## 2025-03-21 PROCEDURE — 80048 BASIC METABOLIC PNL TOTAL CA: CPT | Performed by: PHYSICIAN ASSISTANT

## 2025-03-21 PROCEDURE — 97535 SELF CARE MNGMENT TRAINING: CPT

## 2025-03-21 PROCEDURE — 82962 GLUCOSE BLOOD TEST: CPT

## 2025-03-21 PROCEDURE — 94760 N-INVAS EAR/PLS OXIMETRY 1: CPT

## 2025-03-21 RX ORDER — METOPROLOL TARTRATE 50 MG
50 TABLET ORAL
Qty: 60 TABLET | Refills: 3 | Status: SHIPPED | OUTPATIENT
Start: 2025-03-21

## 2025-03-21 RX ORDER — HYDROCODONE BITARTRATE AND ACETAMINOPHEN 5; 325 MG/1; MG/1
1 TABLET ORAL EVERY 6 HOURS PRN
Qty: 30 TABLET | Refills: 0 | Status: SHIPPED | OUTPATIENT
Start: 2025-03-21

## 2025-03-21 RX ORDER — POTASSIUM CHLORIDE 1500 MG/1
40 TABLET, EXTENDED RELEASE ORAL ONCE
Status: COMPLETED | OUTPATIENT
Start: 2025-03-21 | End: 2025-03-21

## 2025-03-21 NOTE — PLAN OF CARE
Aox4, vss, afebrile. Reporting incisional pain, prn given with relief. Ra while awake 1L with sleep. Tele nsr/st with ambulation. Teds/scd. Qid accucheck. Post op day 3 cabg, mid sternal dressing in place. Cdi, betadine applied. x2 harvest sites L leg.  Ambulated sb assist, walked halls tonight. I/o dw. Updated pt on poc for noc. No acute events will follow.   Problem: Diabetes/Glucose Control  Goal: Glucose maintained within prescribed range  Description: INTERVENTIONS:- Monitor Blood Glucose as ordered- Assess for signs and symptoms of hyperglycemia and hypoglycemia- Administer ordered medications to maintain glucose within target range- Assess barriers to adequate nutritional intake and initiate nutrition consult as needed- Instruct patient on self management of diabetes  Outcome: Progressing     Problem: Patient/Family Goals  Goal: Patient/Family Long Term Goal  Description: Patient's Long Term Goal: Interventions:- - See additional Care Plan goals for specific interventions  Outcome: Progressing  Goal: Patient/Family Short Term Goal  Description: Patient's Short Term Goal: Interventions: - - See additional Care Plan goals for specific interventions  Outcome: Progressing     Problem: SAFETY ADULT - FALL  Goal: Free from fall injury  Description: INTERVENTIONS:- Assess pt frequently for physical needs- Identify cognitive and physical deficits and behaviors that affect risk of falls.- [place] fall precautions as indicated by assessment.- Educate pt/family on patient safety including physical limitations- Instruct pt to call for assistance with activity based on assessment- Modify environment to reduce risk of injury- Provide assistive devices as appropriate- Consider OT/PT consult to assist with strengthening/mobility- Encourage toileting schedule  Outcome: Progressing     Problem: CARDIOVASCULAR - ADULT  Goal: Maintains optimal cardiac output and hemodynamic stability  Description: INTERVENTIONS:- Monitor vital  signs, rhythm, and trends- Monitor for bleeding, hypotension and signs of decreased cardiac output- Evaluate effectiveness of vasoactive medications to optimize hemodynamic stability- Monitor arterial and/or venous puncture sites for bleeding and/or hematoma- Assess quality of pulses, skin color and temperature- Assess for signs of decreased coronary artery perfusion - ex. Angina- Evaluate fluid balance, assess for edema, trend weights  Outcome: Progressing  Goal: Absence of cardiac arrhythmias or a  Problem: RESPIRATORY - ADULT  Goal: Achieves optimal ventilation and oxygenation  Description: INTERVENTIONS:- Assess for changes in respiratory status- Assess for changes in mentation and behavior- Position to facilitate oxygenation and minimize respiratory effort- Oxygen supplementation based on oxygen saturation or ABGs- Provide Smoking Cessation handout, if applicable- Encourage broncho-pulmonary hygiene including cough, deep breathe, Incentive Spirometry- Assess the need for suctioning and perform as needed- Assess and instruct to report SOB or any respiratory difficulty- Respiratory Therapy support as indicated- Manage/alleviate anxiety- Monitor for signs/symptoms of CO2 retention  Outcome: Progressing     Problem: GASTROINTESTINAL - ADULT  Goal: Maintains or returns to baseline bowel function  Description: INTERVENTIONS:- Assess bowel function- Maintain adequate hydration with IV or PO as ordered and tolerated- Evaluate effectiveness of GI medications- Encourage mobilization and activity- Obtain nutritional consult as needed- Establish a toileting routine/schedule- Consider collaborating with pharmacy to review patient's medication profile  Outcome: Progressing     Problem: METABOLIC/FLUID AND ELECTROLYTES - ADULT  Goal: Electrolytes maintained within normal limits  Description: INTERVENTIONS:- Monitor labs and rhythm and assess patient for signs and symptoms of electrolyte imbalances- Administer electrolyte  replacement as ordered- Monitor response to electrolyte replacements, including rhythm and repeat lab results as appropriate- Fluid restriction as ordered- Instruct patient on fluid and nutrition restrictions as appropriate  Outcome: Progressing     Problem: SKIN/TISSUE INTEGRITY - ADULT  Goal: Incision(s), wounds(s) or drain site(s) healing without S/S of infection  Description: INTERVENTIONS:- Assess and document risk factors for pressure ulcer development- Assess and document skin integrity- Assess and document dressing/incision, wound bed, drain sites and surrounding tissue- Implement wound care per orders- Initiate isolation precautions as appropriate- Initiate Pressure Ulcer prevention bundle as indicated  Outcome: Progressing   t baseline  Description: INTERVENTIONS:- Continuous cardiac monitoring, monitor vital signs, obtain 12 lead EKG if indicated- Evaluate effectiveness of antiarrhythmic and heart rate control medications as ordered- Initiate emergency measures for life threatening arrhythmias- Monitor electrolytes and administer replacement therapy as ordered  Outcome: Progressing

## 2025-03-21 NOTE — PROGRESS NOTES
[facility] Cardiology Progress Note        [facility] Patient Status:  Inpatient    4/15/1965 MRN [id number]   [facility] 6NE-A Attending Masoud Horn MD   Hosp Day # 3 PCP Ida Kebede MD     Subjective:  The patient denies  chest pain and shortness of breath.  Ambulated today on stairs    Medications:   metoprolol tartrate  50 mg Oral 2x Daily(Beta Blocker)    insulin aspart  2-10 Units Subcutaneous TID AC and HS    aspirin  81 mg Oral Daily    rosuvastatin  10 mg Oral Nightly    sennosides  8.6 mg Oral BID    docusate sodium  100 mg Oral BID    mupirocin  1 Application Nasal BID    pantoprazole  40 mg Oral QAM AC       Continuous Infusions:          Allergies:  Allergies[1]      Objective:        Intake/Output:      Intake/Output Summary (Last 24 hours) at 3/21/2025 0945  Last data filed at 3/21/2025 0811  Gross per 24 hour   Intake 1435 ml   Output 2850 ml   Net -1415 ml     Wt Readings from Last 3 Encounters:   25 230 lb 13.2 oz (104.7 kg)   25 235 lb (106.6 kg)   22 223 lb (101.2 kg)       Physical Exam:        Vitals:    25 0205 25 0206 25 0500 25 0811   BP:   117/79 109/72   BP Location:   Right arm Left arm   Pulse: 108 112 103 94   Resp:   18 17   Temp:   98.1 °F (36.7 °C) 99.4 °F (37.4 °C)   TempSrc:   Oral Oral   SpO2: (!) 86% (!) 80% 96% 92%   Weight:    230 lb 13.2 oz (104.7 kg)   Height:           Temp:  [97.7 °F (36.5 °C)-99.4 °F (37.4 °C)] 99.4 °F (37.4 °C)  Pulse:  [] 94  Resp:  [17-30] 17  BP: (109-133)/(69-86) 109/72  SpO2:  [80 %-96 %] 92 %      Temp: 99.4 °F (37.4 °C)  Pulse: 94  Resp: 17  BP: 109/72  General:  Appears comfortable  HEENT: No focal deficits.  Neck: No JVD, carotids 2+ no bruits.  Cardiac: Regular S1S2.  No S3, S4, rub, click.  No murmur.  Lungs: Clear to auscultation and percussion.  Abdomen: Soft, non-tender.   Extremities: No LE edema.  No clubbing or cyanosis.    Neurologic: Alert and  oriented, normal affect.  Skin: Warm and dry.           LABS:      HEM:  Recent Labs   Lab 03/18/25  1433 03/19/25  0415 03/20/25  0336 03/21/25  0450   WBC 11.0 13.6* 12.1* 9.2   HGB 15.3 15.1 13.6 12.8*   HCT 44.5 44.7 40.4 36.9*   .0* 152.0 112.0* 127.0*       Chem:  Recent Labs   Lab 03/18/25  1433 03/19/25  0415 03/20/25  0336 03/21/25  0449    142 138 139   K 4.4 5.1 4.3 3.8    108 101 102   CO2 28.0 27.0 30.0 31.0   BUN 17 18 16 17   CREATSERUM 1.14 1.20 1.17 0.94   CA 8.6* 8.5* 8.1* 8.7   MG 2.4 2.1  --   --    * 145* 165* 134*       No results for input(s): \"ALT\", \"AST\", \"ALB\", \"AMYLASE\", \"LIPASE\", \"LDH\" in the last 168 hours.    Invalid input(s): \"ALPHOS\", \"TBIL\", \"DBIL\", \"TPROT\"    Recent Labs   Lab 03/18/25  1433   PTT 28.0   INR 1.12           No results found for: \"TROP\", \"CKMB\"      Invalid input(s): \"PBNPML\"                       Diagnostics:        Telemetry: SR/ST. /min     Laboratories and Data:  Diagnostics:     EKG, 3/18/2025:  ST, 110/min, LAD     CXR, 3/18/2025:                Impression:     1.  CAD.  Multivessel disease. Worst in LAD/Diagonal distribution, s/p 4 vessel CABG to LAD, diagonal, OM, PDA 3/18/25.  Stable rhythm     2. Diabetes     3. Dyslipidemia      4. Tobacco use     Recommend:     Discussed smoking cessation, Diabetes control, statin Rx  telemetry  Lasix  Currently on metoprolol at 50 mg bid  Statin, asa    Home today or in am    Nolberto Guerra MD         [1]   Allergies  Allergen Reactions    Pravastatin SWELLING, FATIGUE and OTHER (SEE COMMENTS)     And tingling

## 2025-03-21 NOTE — PROGRESS NOTES
CC: follow-up hospital admission cabg    SUBJECTIVE:  Interval History:     Pain controlled  Having flatus no bm  No nv  No dizziness    OBJECTIVE:  Scheduled Meds:    metoprolol tartrate  50 mg Oral 2x Daily(Beta Blocker)    insulin aspart  2-10 Units Subcutaneous TID AC and HS    aspirin  81 mg Oral Daily    rosuvastatin  10 mg Oral Nightly    sennosides  8.6 mg Oral BID    docusate sodium  100 mg Oral BID    mupirocin  1 Application Nasal BID    pantoprazole  40 mg Oral QAM AC     Continuous Infusions:       PRN Meds:   HYDROcodone-acetaminophen **OR** HYDROcodone-acetaminophen    melatonin    polyethylene glycol (PEG 3350)    bisacodyl    ondansetron    morphINE **OR** morphINE    glucose **OR** glucose **OR** glucose-vitamin C **OR** dextrose **OR** glucose **OR** glucose **OR** glucose-vitamin C    PHYSICAL EXAM  Vital signs: Temp:  [98.1 °F (36.7 °C)-99.4 °F (37.4 °C)] 98.4 °F (36.9 °C)  Pulse:  [] 104  Resp:  [17-21] 18  BP: (109-129)/(69-86) 128/77  SpO2:  [80 %-96 %] 94 %      GENERAL - NAD   EYES- sclera anicteric   HENT- normocephalic,   NECK - no JVD  CV- RRR  RESP -   ctab normal resp effort  ABDOMEN- soft, NT/ND   EXT- trace LE edema,        Data Review:   Labs:   Recent Labs   Lab 03/18/25  1433 03/19/25  0415 03/20/25  0336 03/21/25  0450   WBC 11.0 13.6* 12.1* 9.2   HGB 15.3 15.1 13.6 12.8*   MCV 89.4 90.7 91.2 90.0   .0* 152.0 112.0* 127.0*   INR 1.12  --   --   --        Recent Labs   Lab 03/18/25  1433 03/19/25  0415 03/20/25  0336 03/21/25  0449    142 138 139   K 4.4 5.1 4.3 3.8    108 101 102   CO2 28.0 27.0 30.0 31.0   BUN 17 18 16 17   CREATSERUM 1.14 1.20 1.17 0.94   CA 8.6* 8.5* 8.1* 8.7   MG 2.4 2.1  --   --    * 145* 165* 134*       No results for input(s): \"ALT\", \"AST\", \"ALB\", \"AMYLASE\", \"LIPASE\", \"LDH\" in the last 168 hours.    Invalid input(s): \"ALPHOS\", \"TBIL\", \"DBIL\", \"TPROT\"    Recent Labs   Lab 03/20/25  1104 03/20/25  1707 03/20/25  2107  03/21/25  0510 03/21/25  1254   PGLU 196* 126* 231* 125* 112*           ASSESSMENT/PLAN:  Patient is a 59 year old male with PMH sig for hld, predm here for scheduled CABG     Impression     -mv CAD sp CABG 03/18/25  -post op pain     -HLD  -predm, A1c 6,1  Tcp - likely reactive     Plan     -post op [facility] per [facility]   On aspirin, bb, statin,   ambulate  -iss prn   Dw RN       Dispo per [facility]  Will continue to follow while hospitalized. Please page me or the on-call hospitalist with questions or concerns.    Pk Owens Hospitalist  [phone]  Answering Service: [phone]

## 2025-03-21 NOTE — PHYSICAL THERAPY NOTE
PHYSICAL THERAPY TREATMENT NOTE - INPATIENT    Room Number: 8620/8620-A     Session: 1     Number of Visits to Meet Established Goals: 5    Presenting Problem: s/p CORONARY ARTERY BYPASS GRAFT X4 USING LEFT INTERNAL MAMMARY ARTERY AND ENDOSCOPIC VEIN HARVEST FROM THE LEFT LEG, AND INTRAOPERATIVE TRANSESOPHAGEAL ECHOCARDIOGRAM    3/18  Co-Morbidities : coronary atherosclerosis, HLD, elevated LDH, HTN, prediabetes    PHYSICAL THERAPY ASSESSMENT     Patient is currently functioning at baseline with bed mobility, transfers, gait, and stair negotiation.    Patient currently does not meet criteria for skilled inpatient physical therapy services, however patient will continue to benefit from QID ambulation with nursing staff.  Pt is discharged from IP PT services.  RN aware to re-order if there is a decline in mobility status.          Patient continues to benefit from continued skilled PT services: with no additional skilled services but increased support at home.    PLAN DURING HOSPITALIZATION  Nursing Mobility Recommendation : 1 Assist  PT Device Recommendation: Rolling walker  PT Treatment Plan: Bed mobility, Body mechanics, Coordination, Endurance, Energy conservation, Patient education, Family education, Gait training, Neuromuscular re-educate, Range of motion, Strengthening, Stoop training, Stair training, Transfer training, Balance training  Frequency (Obs): 3-5x/week     CURRENT GOALS     Goal #1 Patient is able to demonstrate supine - sit EOB @ level: supervision      Goal #2 Patient is able to demonstrate transfers EOB to/from Chair/Wheelchair at assistance level: supervision      Goal #3 Patient is able to ambulate 150 feet with assist device: walker - rolling at assistance level: supervision      Goal #4 Patient is able to navigate stairs with rail and SBA   Goal #5 Patient is able to participate in CV binder HEP   Goal #6     Goal Comments: Goals established on 3/19/2025    3/21/2025 all goals  achieved    SUBJECTIVE  \"I am doing great!\"    OBJECTIVE  Precautions: Sternal, Cardiac    WEIGHT BEARING RESTRICTION     PAIN ASSESSMENT   Ratin  Location: DENIED  Management Techniques: Activity promotion, Body mechanics, Repositioning    BALANCE                                                                                                                       Static Sitting: Good  Dynamic Sitting: Good           Static Standing: Fair -  Dynamic Standing: Fair -    ACTIVITY TOLERANCE                         O2 WALK  Oxygen Therapy  SPO2% on Room Air at Rest: 94    AM-PAC '6-Clicks' INPATIENT SHORT FORM - BASIC MOBILITY  How much difficulty does the patient currently have...  Patient Difficulty: Turning over in bed (including adjusting bedclothes, sheets and blankets)?: None   Patient Difficulty: Sitting down on and standing up from a chair with arms (e.g., wheelchair, bedside commode, etc.): None   Patient Difficulty: Moving from lying on back to sitting on the side of the bed?: None   How much help from another person does the patient currently need...   Help from Another: Moving to and from a bed to a chair (including a wheelchair)?: None   Help from Another: Need to walk in hospital room?: None   Help from Another: Climbing 3-5 steps with a railing?: None     AM-PAC Score:  Raw Score: 24   Approx Degree of Impairment: 0%   Standardized Score (AM-PAC Scale): 61.14   CMS Modifier (G-Code): CH    FUNCTIONAL ABILITY STATUS  Gait Assessment   Functional Mobility/Gait Assessment  Gait Assistance: Independent  Distance (ft): 400  Assistive Device: None  Pattern: Within Functional Limits  Stairs: Stairs  How Many Stairs: 12  Device: 1 Rail  Assist: Modified independent  Pattern: Ascend and Descend  Ascend and Descend : Step to    Skilled Therapy Provided    Bed Mobility:  Rolling: Mod I   Supine<>Sit: Mod I   Sit<>Supine: Mod I     Transfer Mobility:  Sit<>Stand: Mod I   Stand<>Sit: Mod I   Gait: Mod I with  RW    Therapist's Comments:        Exercises Repetitions   UPPER EXTREMITY     Head turns 10   Hand pumps 10   Shoulder shrugs 10   Bicep Curls - Alternating 10   Shoulder Flexion > 90 degrees 10         LOWER EXTREMITY     Ankle pumps 10   Ankle circles 10   Heel Raises - bilateral 10   Partial Arc Quad - alternating 10   Seated Marching 10         CHEST EXERCISES     Trunk Rotation 10   Elbow Circles 10   Chest Fly's  10   Scapular Retraction 10       Patient End of Session: In bed, Needs met, Call light within reach, RN aware of session/findings, All patient questions and concerns addressed, Hospital anti-slip socks    PT Session Time: 23 minutes  Gait Training: 15 minutes  Therapeutic Activity: 0 minutes  Therapeutic Exercise: 8 minutes   Neuromuscular Re-education: 0 minutes

## 2025-03-21 NOTE — PAYOR COMM NOTE
--------------  ADMISSION REVIEW     Payor: [employer]  Subscriber #:  [id number]  Authorization Number: [id number]    Admit date: 3/18/25  Admit time:  8:14 AM         Date of operation: 3/18/2025     Preoperative diagnosis: CAD     Postoperative diagnosis: CAD     Operation: Coronary Artery Bypass Graft times 4___ with :                    Left internal mammary artery to the left anterior descending artery                    Reverse greater saphenous vein graft to the diagonal artery                    Reverse greater saphenous vein graft to the obtuse marginal / Ramus artery                    Reverse greater saphenous vein graft to the posterior descending artery                     Ultrasound mapping of bilateral saphenous veins              Endoscopic harvest of left greater saphenous vein              Titanium plate closure of complex sternal wound            Surgeon: Hu Noyola     Asst.: Masoud CORRALES, Pavan CORRALES             Findings: Good distal targets , Good vein conduit , small LIMA with good flow   Specimens Removed: none  Estimated Blood Loss: 200cc Cell saver used  Blood Administered: none  Complications: none  Grafts/Implants: titanium plates   Anesthesia: General  Cardioplegia 2 liter       3/21:    CARDS:      The patient denies  chest pain and shortness of breath.  Ambulated today on stairs     Medications:  Scheduled Medications    metoprolol tartrate  50 mg Oral 2x Daily(Beta Blocker)    insulin aspart  2-10 Units Subcutaneous TID AC and HS    aspirin  81 mg Oral Daily    rosuvastatin  10 mg Oral Nightly    sennosides  8.6 mg Oral BID    docusate sodium  100 mg Oral BID    mupirocin  1 Application Nasal BID    pantoprazole  40 mg Oral QAM AC               Vitals:     03/21/25 0205 03/21/25 0206 03/21/25 0500 03/21/25 0811   BP:     117/79 109/72   BP Location:     Right arm Left arm   Pulse: 108 112 103 94   Resp:     18 17   Temp:     98.1 °F (36.7  °C) 99.4 °F (37.4 °C)   TempSrc:     Oral Oral   SpO2: (!) 86% (!) 80% 96% 92%   Weight:       230 lb 13.2 oz (104.7 kg)   Height:                    Temp:  [97.7 °F (36.5 °C)-99.4 °F (37.4 °C)] 99.4 °F (37.4 °C)  Pulse:  [] 94  Resp:  [17-30] 17  BP: (109-133)/(69-86) 109/72  SpO2:  [80 %-96 %] 92 %      Temp: 99.4 °F (37.4 °C)  Pulse: 94  Resp: 17  BP: 109/72  General:  Appears comfortable  HEENT: No focal deficits.  Neck: No JVD, carotids 2+ no bruits.  Cardiac: Regular S1S2.  No S3, S4, rub, click.  No murmur.  Lungs: Clear to auscultation and percussion.  Abdomen: Soft, non-tender.   Extremities: No LE edema.  No clubbing or cyanosis.    Neurologic: Alert and oriented, normal affect.  Skin: Warm and dry.      Lab 03/18/25  1433 03/19/25  0415 03/20/25  0336 03/21/25  0450   WBC 11.0 13.6* 12.1* 9.2   HGB 15.3 15.1 13.6 12.8*   HCT 44.5 44.7 40.4 36.9*   .0* 152.0 112.0* 127.0*      Lab 03/18/25  1433 03/19/25  0415 03/20/25  0336 03/21/25  0449    142 138 139   K 4.4 5.1 4.3 3.8    108 101 102   CO2 28.0 27.0 30.0 31.0   BUN 17 18 16 17   CREATSERUM 1.14 1.20 1.17 0.94   CA 8.6* 8.5* 8.1* 8.7   MG 2.4 2.1  --   --    * 145* 165* 134*       Impression:     1.  CAD.  Multivessel disease. Worst in LAD/Diagonal distribution, s/p 4 vessel CABG to LAD, diagonal, OM, PDA 3/18/25.  Stable rhythm     2. Diabetes     3. Dyslipidemia      4. Tobacco use     Recommend:     Discussed smoking cessation, Diabetes control, statin Rx  telemetry  Lasix  Currently on metoprolol at 50 mg bid  Statin, asa     MEDICATIONS ADMINISTERED IN LAST 1 DAY:  aspirin DR tab 81 mg       Date Action Dose Route User    3/21/2025 0820 Given 81 mg Oral Eliana Avendano, RN          docusate sodium (Colace) cap 100 mg       Date Action Dose Route User    3/21/2025 0820 Given 100 mg Oral Eliana Avendano, RN    3/20/2025 2036 Given 100 mg Oral Beverly Moise RN          furosemide (Lasix) 10 mg/mL injection 20 mg        Date Action Dose Route User    3/20/2025 1713 Given 20 mg Intravenous Markus Rosales RN          HYDROcodone-acetaminophen (Norco) 5-325 MG per tab 1 tablet       Date Action Dose Route User    3/21/2025 1229 Given 1 tablet Oral Eliana Avendano RN    3/21/2025 0216 Given 1 tablet Oral Beverly Moise RN    3/20/2025 2044 Given 1 tablet Oral Beverly Moise RN    3/20/2025 1503 Given 1 tablet Oral Markus Rosales RN          insulin aspart (NovoLOG) 100 Units/mL FlexPen 2-10 Units       Date Action Dose Route User    3/20/2025 2108 Given 6 Units Subcutaneous (Left Lower Abdomen) Beverly Moise RN          metoprolol tartrate (Lopressor) tab 50 mg       Date Action Dose Route User    3/21/2025 0504 Given 50 mg Oral Beverly Moise RN    3/20/2025 1749 Given 50 mg Oral Markus Rosales RN          mupirocin (Bactroban) 2% nasal ointment 1 Application       Date Action Dose Route User    3/21/2025 0820 Given 1 Application Nasal (Bilateral Nares) Eliana Avendano RN    3/20/2025 2036 Given 1 Application Nasal (Bilateral Nares) Beverly Moise RN          pantoprazole (Protonix) DR tab 40 mg       Date Action Dose Route User    3/21/2025 0504 Given 40 mg Oral Beverly Moise RN          potassium chloride (Klor-Con M20) tab 40 mEq       Date Action Dose Route User    3/21/2025 0820 Given 40 mEq Oral Eliana Avendano RN          rosuvastatin (Crestor) tab 10 mg       Date Action Dose Route User    3/20/2025 2036 Given 10 mg Oral Beverly Moise RN          sennosides (Senokot) tab 8.6 mg       Date Action Dose Route User    3/21/2025 0820 Given 8.6 mg Oral Eliana Avendano RN    3/20/2025 2036 Given 8.6 mg Oral Beverly Moise RN            Vitals (last day)       Date/Time Temp Pulse Resp BP SpO2 Weight O2 Device O2 Flow Rate (L/min) Who    03/21/25 1326 -- 104 -- -- 94 % -- -- -- JN    03/21/25 1306 -- 110 -- -- 94 % -- -- --     03/21/25 1224 98.4 °F (36.9 °C) 101 18 128/77 95 % -- None (Room air) -- TAINA     03/21/25 1110 -- 99 -- -- 93 % -- -- --     03/21/25 0811 99.4 °F (37.4 °C) 94 17 109/72 92 % 230 lb 13.2 oz (104.7 kg) -- --     03/21/25 0500 98.1 °F (36.7 °C) 103 18 117/79 96 % -- Nasal cannula 1 L/min     03/21/25 0207 -- -- -- -- -- -- Nasal cannula --     03/21/25 0206 -- 112 -- -- 80 % -- None (Room air) --     03/21/25 0205 -- 108 -- -- 86 % -- None (Room air) --     03/21/25 0000 98.6 °F (37 °C) 102 20 120/69 91 % -- Nasal cannula 1 L/min     03/20/25 2238 -- 99 -- -- 95 % -- None (Room air) --     03/20/25 1930 98.5 °F (36.9 °C) 99 20 115/72 96 % -- Nasal cannula 1 L/min AO    03/20/25 1812 -- 119 -- -- 95 % -- -- --     03/20/25 1705 98.4 °F (36.9 °C) 116 21 129/86 96 % -- None (Room air) --     03/20/25 1700 98.4 °F (36.9 °C) -- 21 -- -- -- None (Room air) --     03/20/25 1524 -- 114 -- -- 95 % -- -- --     03/20/25 1258 97.7 °F (36.5 °C) 111 30 133/78 93 % -- Nasal cannula 1 L/min     03/20/25 1200 98.4 °F (36.9 °C) 108 21 131/83 95 % -- Nasal cannula 1 L/min     03/20/25 1100 -- 109 29 126/76 92 % -- -- --     03/20/25 1000 -- 107 28 125/73 93 % -- -- --     03/20/25 0930 -- -- -- -- -- -- -- 1 L/min     03/20/25 0900 -- 108 27 113/79 92 % -- -- --     03/20/25 0800 97.2 °F (36.2 °C) 99 12 117/80 95 % -- None (Room air) --     03/20/25 0700 -- 119 24 133/83 95 % -- -- --     03/20/25 0630 -- 127 26 -- -- 235 lb 1.6 oz (106.6 kg) -- --     03/20/25 0601 -- 106 26 131/76 94 % -- -- --     03/20/25 0600 -- 112 27 146/72 -- -- -- --     03/20/25 0530 -- 111 27 -- -- -- -- --     03/20/25 0515 -- 109 22 124/77 97 % -- -- --     03/20/25 0500 -- 107 21 132/84 -- -- -- --     03/20/25 0430 -- 107 25 -- -- -- -- --     03/20/25 0400 98.8 °F (37.1 °C) 108 24 127/82 96 % -- Nasal cannula 3 L/min     03/20/25 0300 -- 108 20 133/86 95 % -- -- --     03/20/25 0200 -- 106 21 116/70 97 % -- -- --     03/20/25 0100 -- 105 24 129/78 95 % -- -- --      03/20/25 0000 99.2 °F (37.3 °C) 104 21 125/73 96 % -- Nasal cannula 3 L/min HH

## 2025-03-21 NOTE — PROGRESS NOTES
[facility]   part of [facility]     CV Surgery Progress Note    Ronald Enmanuel Patient Status:  Inpatient    4/15/1965 MRN [id number]   Location [facility] 6NE-A Attending Masoud Horn MD   Hosp Day # 3 PCP Ida Kebede MD     Subjective:  Patient reports feeling good, offers no current complaints. Pain is controlled. Ambulating well. +BM.    Tele: SR    Objective:  /72 (BP Location: Left arm)   Pulse 94   Temp 99.4 °F (37.4 °C) (Oral)   Resp 17   Ht 5' 11\" (1.803 m)   Wt 230 lb 13.2 oz (104.7 kg)   SpO2 92%   BMI 32.19 kg/m²     Intake/Output:    Intake/Output Summary (Last 24 hours) at 3/21/2025 0948  Last data filed at 3/21/2025 0811  Gross per 24 hour   Intake 1435 ml   Output 2850 ml   Net -1415 ml       Labs:  Lab Results   Component Value Date    WBC 9.2 2025    RBC 4.10 2025    HGB 12.8 2025    HCT 36.9 2025    MCV 90.0 2025    MCH 31.2 2025    MCHC 34.7 2025    RDW 12.9 2025    .0 2025     Lab Results   Component Value Date     2025    K 3.8 2025     2025    CO2 31.0 2025    BUN 17 2025    CREATSERUM 0.94 2025     2025    CA 8.7 2025     Lab Results   Component Value Date    INR 1.12 2025    INR 0.89 03/10/2025    INR 0.93 2022       Physical Exam:  General: VSS, A&Ox3, In NAD  Neck: No JVD  Lungs: clear anteriorly   Heart: S1,S2 RRR. Sternum Stable   Abdomen: Soft, non-tender, +bs  Extremities: Warm, dry, no edema  Skin: sternotomy incision C/D/I, LE Incision C/D/I  Neuro: no focal deficits     Assessment/Plan:   S/P CABGx4 with LIMA-LAD, SVG-Diagonal. OM/Ramus, PDA POD#3  -HD stable, off support   -Leukocytosis, afebrile, likely reactive, resolved- monitor   -TCP, likely 2/2 consumption, improving- monitor   -Volume status improved s/p diuresis, weight below baseline  -Renal function intact, good UOP  -Bowel regimen   -Pain management, prn  Nutrition Services:    Consult for high fiber diet education acknowledged from General Surgery. Per discussion with General Surgery, this may not be obstruction and rather constipation. GI consult pending.     RD to follow with appropriate diet education when GI consult completed and their recommendations and plan of care known.     Thank You,   Sandra Altamirano RD, CD   Pager [phone]   norco   -Chest tubes and PW removed  -GI PPX: protonix   -DVT PPX: TEDs/SCDs  -Encourage IS/ambulation   -PT/OT/Cardiac rehab   -Ok to discharge home today or tomorrow from surgical standpoint      -D/W Dr. Kory Haji, PA-C   Cardiothoracic Surgery   3/21/2025  9:48 AM

## 2025-03-21 NOTE — PLAN OF CARE
Acquired patient around 0730. POD #3. Midsternal incision, LLE x2 incision. Alert and oriented x4. On Ra. SPO2 within normal limits. NSR/ ST w rare PVCs on tele. Pt denies sob. Pt does co midsternal pain rating 3/10. PRN pain medication. Continent of bowel and bladder. Call light within reach. Continue plan of care.     Problem: Diabetes/Glucose Control  Goal: Glucose maintained within prescribed range  Description: INTERVENTIONS:- Monitor Blood Glucose as ordered- Assess for signs and symptoms of hyperglycemia and hypoglycemia- Administer ordered medications to maintain glucose within target range- Assess barriers to adequate nutritional intake and initiate nutrition consult as needed- Instruct patient on self management of diabetes  Outcome: Progressing     Problem: SAFETY ADULT - FALL  Goal: Free from fall injury  Description: INTERVENTIONS:- Assess pt frequently for physical needs- Identify cognitive and physical deficits and behaviors that affect risk of falls.- [place] fall precautions as indicated by assessment.- Educate pt/family on patient safety including physical limitations- Instruct pt to call for assistance with activity based on assessment- Modify environment to reduce risk of injury- Provide assistive devices as appropriate- Consider OT/PT consult to assist with strengthening/mobility- Encourage toileting schedule  Outcome: Progressing     Problem: CARDIOVASCULAR - ADULT  Goal: Maintains optimal cardiac output and hemodynamic stability  Description: INTERVENTIONS:- Monitor vital signs, rhythm, and trends- Monitor for bleeding, hypotension and signs of decreased cardiac output- Evaluate effectiveness of vasoactive medications to optimize hemodynamic stability- Monitor arterial and/or venous puncture sites for bleeding and/or hematoma- Assess quality of pulses, skin color and temperature- Assess for signs of decreased coronary artery perfusion - ex. Angina- Evaluate fluid balance, assess for edema,  trend weights  Outcome: Progressing  Goal: Absence of cardiac arrhythmias or at baseline  Description: INTERVENTIONS:- Continuous cardiac monitoring, monitor vital signs, obtain 12 lead EKG if indicated- Evaluate effectiveness of antiarrhythmic and heart rate control medications as ordered- Initiate emergency measures for life threatening arrhythmias- Monitor electrolytes and administer replacement therapy as ordered  Outcome: Progressing     Problem: RESPIRATORY - ADULT  Goal: Achieves optimal ventilation and oxygenation  Description: INTERVENTIONS:- Assess for changes in respiratory status- Assess for changes in mentation and behavior- Position to facilitate oxygenation and minimize respiratory effort- Oxygen supplementation based on oxygen saturation or ABGs- Provide Smoking Cessation handout, if applicable- Encourage broncho-pulmonary hygiene including cough, deep breathe, Incentive Spirometry- Assess the need for suctioning and perform as needed- Assess and instruct to report SOB or any respiratory difficulty- Respiratory Therapy support as indicated- Manage/alleviate anxiety- Monitor for signs/symptoms of CO2 retention  Outcome: Progressing     Problem: GASTROINTESTINAL - ADULT  Goal: Maintains or returns to baseline bowel function  Description: INTERVENTIONS:- Assess bowel function- Maintain adequate hydration with IV or PO as ordered and tolerated- Evaluate effectiveness of GI medications- Encourage mobilization and activity- Obtain nutritional consult as needed- Establish a toileting routine/schedule- Consider collaborating with pharmacy to review patient's medication profile  Outcome: Progressing     Problem: METABOLIC/FLUID AND ELECTROLYTES - ADULT  Goal: Electrolytes maintained within normal limits  Description: INTERVENTIONS:- Monitor labs and rhythm and assess patient for signs and symptoms of electrolyte imbalances- Administer electrolyte replacement as ordered- Monitor response to electrolyte  replacements, including rhythm and repeat lab results as appropriate- Fluid restriction as ordered- Instruct patient on fluid and nutrition restrictions as appropriate  Outcome: Progressing     Problem: SKIN/TISSUE INTEGRITY - ADULT  Goal: Incision(s), wounds(s) or drain site(s) healing without S/S of infection  Description: INTERVENTIONS:- Assess and document risk factors for pressure ulcer development- Assess and document skin integrity- Assess and document dressing/incision, wound bed, drain sites and surrounding tissue- Implement wound care per orders- Initiate isolation precautions as appropriate- Initiate Pressure Ulcer prevention bundle as indicated  Outcome: Progressing

## 2025-03-21 NOTE — OCCUPATIONAL THERAPY NOTE
OCCUPATIONAL THERAPY TREATMENT NOTE - INPATIENT     Room Number: 8620/8620-A  Session: 1   Number of Visits to Meet Established Goals: 3    Presenting Problem: s/p 3/18 CABG x 4 using left internal mammary artery and endoscopic vein harvest from left leg, BUCKY    HOME SITUATION  Type of Home: House  Home Layout: Multi-level  Lives With: Spouse, Son, Daughter     Toilet and Equipment: Standard height toilet  Shower/Tub and Equipment: Walk-in shower     Occupation/Status: Works as   Drives: Yes  Patient Regularly Uses: Reading glasses     Prior Level of Function: Independent with ADLs and IADLs. Working as . Enjoys golfing.     ASSESSMENT   Patient demonstrates excellent progress this session, goals updated to reflect patient performance.    Patient functions at baseline with  ADLs and functional transfers .   Occupational Therapy will discharge patient at this time as all goals have been met at independent level.    Patient would benefit from home at discharge.    History: Patient is a 59 year old male admitted on 3/18/2025 with Presenting Problem: s/p 3/18 CABG x 4 using left internal mammary artery and endoscopic vein harvest from left leg, BUCKY. Co-Morbidities : coronary atherosclerosis, HLD, elevated LDH, HTN, prediabetes    WEIGHT BEARING RESTRICTION       Recommendations for nursing staff:   Transfers: 1 person  Toileting location: toilet    TREATMENT SESSION:  Patient Start of Session: semi supine in bed    FUNCTIONAL TRANSFER ASSESSMENT  Sit to Stand: Edge of Bed; Chair  Edge of Bed: Independent  Chair: Independent  Toilet Transfer: Independent    BED MOBILITY  Rolling: Independent  Supine to Sit : Independent  Scooting: Independent    BALANCE ASSESSMENT  Static Sitting: Independent  Static Standing: Independent    FUNCTIONAL ADL ASSESSMENT  LB Dressing Seated: Independent  LB Dressing Standing: Independent  Toileting Seated: Independent  Toileting Standing: Independent    ACTIVITY TOLERANCE:  WFL                         O2 SATURATIONS       EDUCATION PROVIDED  Patient Education : Role of Occupational Therapy; Plan of Care; Discharge Recommendations; Functional Transfer Techniques; Fall Prevention; Surgical Precautions; Posture/Positioning; Energy Conservation; Proper Body Mechanics  Patient's Response to Education: Verbalized Understanding; Returned Demonstration  Family/Caregiver Education : Role of Occupational Therapy; Plan of Care  Family/Caregiver's Response to Education: Verbalized Understanding    Exercises Repetitions   UPPER EXTREMITY     Head turns 10   Hand pumps 10   Shoulder shrugs 10   Bicep Curls - Alternating 10   Shoulder Flexion > 90 degrees 10         LOWER EXTREMITY     Ankle pumps 10   Ankle circles 10   Heel Raises - bilateral 10   Partial Arc Quad - alternating 10   Seated Marching 10         CHEST EXERCISES     Trunk Rotation 10   Elbow Circles 10   Chest Fly's  10   Scapular Retraction 10     Therapist comments: Pt performs bed mobility independently from flat surface. While seated EOB, pt completes CV binder exercises as listed above. Pt then demos LB dressing at mod I with figure 4 sitting. Pt transfers to/from toilet independently with no device and completes toileting independently. Pt with no further questions and returns to supine in bed independently. Pt educated on pacing self with mobility and exercises.     Patient End of Session: In bed, Needs met, Call light within reach, RN aware of session/findings, All patient questions and concerns addressed, Hospital anti-slip socks, Family present    SUBJECTIVE  \"Let's do the exercises again.\"    PAIN ASSESSMENT  Ratin  Location: denies        OBJECTIVE  Precautions: Sternal, Cardiac    AM-PAC ‘6-Clicks’ Inpatient Daily Activity Short Form  -   Putting on and taking off regular lower body clothing?: A Little  -   Bathing (including washing, rinsing, drying)?: A Little  -   Toileting, which includes using toilet, bedpan or  urinal? : A Little  -   Putting on and taking off regular upper body clothing?: None  -   Taking care of personal grooming such as brushing teeth?: None  -   Eating meals?: None    AM-PAC Score:  Score: 21  Approx Degree of Impairment: 32.79%  Standardized Score (AM-PAC Scale): 44.27    PLAN     OT Treatment Plan: Energy conservation/work simplification techniques, UE strengthening/ROM, Patient/Family education  Rehab Potential : Good  Frequency: 3-5x/week    OT Goals:     All goals ongoing 03/21    ADL Goals   Patient will perform lower body dressing:  with modified independent  Patient will perform toileting: with supervision     Functional Transfer Goals  Patient will transfer from sit to supine:  with supervision  Patient will transfer from supine to sit:  with supervision  Patient will transfer to toilet:  with supervision     UE Exercise Program Goal  Patient will be independent with bilateral AROM HEP (home exercise program).     Additional Goals  Pt will verbalize two energy conservation techniques to use with ADLs.      Therapist Goals  Introduce CVOR UE HEP and energy conservation     OT Session Time: 25 minutes  Self-Care Home Management: 15 minutes  Therapeutic Exercise: 10 minutes

## 2025-03-22 VITALS
DIASTOLIC BLOOD PRESSURE: 69 MMHG | BODY MASS INDEX: 31.85 KG/M2 | SYSTOLIC BLOOD PRESSURE: 107 MMHG | RESPIRATION RATE: 18 BRPM | HEIGHT: 71 IN | WEIGHT: 227.5 LBS | TEMPERATURE: 98 F | HEART RATE: 92 BPM | OXYGEN SATURATION: 95 %

## 2025-03-22 LAB
GLUCOSE BLD-MCNC: 117 MG/DL (ref 70–99)
POTASSIUM SERPL-SCNC: 4.3 MMOL/L (ref 3.5–5.1)

## 2025-03-22 PROCEDURE — 82962 GLUCOSE BLOOD TEST: CPT

## 2025-03-22 PROCEDURE — 84132 ASSAY OF SERUM POTASSIUM: CPT | Performed by: HOSPITALIST

## 2025-03-22 NOTE — PLAN OF CARE
Patient Alert and oriented x 4. Up with stand by assist. On RA. NSR on tele. Continent of bowel and bladder. No complaint of pain, chest pain, and/or SOB. POC discussed with patient. Call light within reach. Fall precaution in place.     Problem: CARDIOVASCULAR - ADULT  Goal: Maintains optimal cardiac output and hemodynamic stability  Description: INTERVENTIONS:- Monitor vital signs, rhythm, and trends- Monitor for bleeding, hypotension and signs of decreased cardiac output- Evaluate effectiveness of vasoactive medications to optimize hemodynamic stability- Monitor arterial and/or venous puncture sites for bleeding and/or hematoma- Assess quality of pulses, skin color and temperature- Assess for signs of decreased coronary artery perfusion - ex. Angina- Evaluate fluid balance, assess for edema, trend weights  Outcome: Progressing  Goal: Absence of cardiac arrhythmias or at baseline  Description: INTERVENTIONS:- Continuous cardiac monitoring, monitor vital signs, obtain 12 lead EKG if indicated- Evaluate effectiveness of antiarrhythmic and heart rate control medications as ordered- Initiate emergency measures for life threatening arrhythmias- Monitor electrolytes and administer replacement therapy as ordered  Outcome: Progressing     Problem: RESPIRATORY - ADULT  Goal: Achieves optimal ventilation and oxygenation  Description: INTERVENTIONS:- Assess for changes in respiratory status- Assess for changes in mentation and behavior- Position to facilitate oxygenation and minimize respiratory effort- Oxygen supplementation based on oxygen saturation or ABGs- Provide Smoking Cessation handout, if applicable- Encourage broncho-pulmonary hygiene including cough, deep breathe, Incentive Spirometry- Assess the need for suctioning and perform as needed- Assess and instruct to report SOB or any respiratory difficulty- Respiratory Therapy support as indicated- Manage/alleviate anxiety- Monitor for signs/symptoms of CO2  retention  Outcome: Progressing     Problem: METABOLIC/FLUID AND ELECTROLYTES - ADULT  Goal: Electrolytes maintained within normal limits  Description: INTERVENTIONS:- Monitor labs and rhythm and assess patient for signs and symptoms of electrolyte imbalances- Administer electrolyte replacement as ordered- Monitor response to electrolyte replacements, including rhythm and repeat lab results as appropriate- Fluid restriction as ordered- Instruct patient on fluid and nutrition restrictions as appropriate  Outcome: Progressing     Problem: SKIN/TISSUE INTEGRITY - ADULT  Goal: Incision(s), wounds(s) or drain site(s) healing without S/S of infection  Description: INTERVENTIONS:- Assess and document risk factors for pressure ulcer development- Assess and document skin integrity- Assess and document dressing/incision, wound bed, drain sites and surrounding tissue- Implement wound care per orders- Initiate isolation precautions as appropriate- Initiate Pressure Ulcer prevention bundle as indicated  Outcome: Progressing

## 2025-03-22 NOTE — PROGRESS NOTES
NURSING DISCHARGE NOTE    Discharged Home via Wheelchair.  Accompanied by Support staff  Belongings Taken by patient/family.      Pt is assessed and ready for discharge. Instructions and follow ups gone over with patient and wife at bedside. IV removed. Tele removed. To lobby via WC with spouse to drive home.

## 2025-03-22 NOTE — PROGRESS NOTES
CC: follow-up hospital admission cabg    SUBJECTIVE:  Interval History:     Pain controlled  Having flatus no bm  No nv  No dizziness    OBJECTIVE:  Scheduled Meds:    metoprolol tartrate  50 mg Oral 2x Daily(Beta Blocker)    insulin aspart  2-10 Units Subcutaneous TID AC and HS    aspirin  81 mg Oral Daily    rosuvastatin  10 mg Oral Nightly    sennosides  8.6 mg Oral BID    docusate sodium  100 mg Oral BID    mupirocin  1 Application Nasal BID    pantoprazole  40 mg Oral QAM AC     Continuous Infusions:       PRN Meds:   HYDROcodone-acetaminophen **OR** HYDROcodone-acetaminophen    melatonin    polyethylene glycol (PEG 3350)    bisacodyl    ondansetron    morphINE **OR** morphINE    glucose **OR** glucose **OR** glucose-vitamin C **OR** dextrose **OR** glucose **OR** glucose **OR** glucose-vitamin C    PHYSICAL EXAM  Vital signs: Temp:  [98.3 °F (36.8 °C)-99 °F (37.2 °C)] 98.3 °F (36.8 °C)  Pulse:  [] 85  Resp:  [17-18] 18  BP: ()/(71-78) 102/72  SpO2:  [83 %-95 %] 95 %      GENERAL - NAD   EYES- sclera anicteric   HENT- normocephalic,   NECK - no JVD  CV- RRR  RESP -   ctab normal resp effort  ABDOMEN- soft, NT/ND   EXT- trace LE edema,        Data Review:   Labs:   Recent Labs   Lab 03/18/25  1433 03/19/25  0415 03/20/25  0336 03/21/25  0450   WBC 11.0 13.6* 12.1* 9.2   HGB 15.3 15.1 13.6 12.8*   MCV 89.4 90.7 91.2 90.0   .0* 152.0 112.0* 127.0*   INR 1.12  --   --   --        Recent Labs   Lab 03/18/25  1433 03/19/25  0415 03/20/25  0336 03/21/25  0449 03/22/25  0535    142 138 139  --    K 4.4 5.1 4.3 3.8 4.3    108 101 102  --    CO2 28.0 27.0 30.0 31.0  --    BUN 17 18 16 17  --    CREATSERUM 1.14 1.20 1.17 0.94  --    CA 8.6* 8.5* 8.1* 8.7  --    MG 2.4 2.1  --   --   --    * 145* 165* 134*  --        No results for input(s): \"ALT\", \"AST\", \"ALB\", \"AMYLASE\", \"LIPASE\", \"LDH\" in the last 168 hours.    Invalid input(s): \"ALPHOS\", \"TBIL\", \"DBIL\", \"TPROT\"    Recent Labs   Lab  03/21/25  0510 03/21/25  1254 03/21/25  1717 03/21/25  2054 03/22/25  0525   PGLU 125* 112* 117* 157* 117*           ASSESSMENT/PLAN:  Patient is a 59 year old male with PMH sig for hld, predm here for scheduled CABG     Impression     -mv CAD sp CABG 03/18/25  -post op pain     -HLD  -predm, A1c 6,1  Tcp - likely reactive     Plan     -post op surgical care per [facility]   On aspirin, bb, statin,   ambulate  -iss prn   Dw RN     Anticipated dc later today    Will continue to follow while hospitalized. Please page me or the on-call hospitalist with questions or concerns.    Pk Owens Hospitalist  [phone]  Answering Service: [phone]

## 2025-03-22 NOTE — PLAN OF CARE
Pt denies c/o pain, malaise or cardiac symptoms. A&O x 4. Lungs clear bilaterally with equal expansion, diminished at bases on room air. Pt NSR on monitor with rare PVCs. Abdomen soft, nontender with active bowel sounds in all four quadrants, continent to b&b. Pt updated with plan of care.

## 2025-03-22 NOTE — PROGRESS NOTES
[facility]   [facility] Progress Note    Gosia Singer Patient Status:  Inpatient    4/15/1965 MRN [id number]   Location [facility] 8NE-A Attending Masoud Horn MD   Hosp Day # 4 PCP Ida Kebede MD     Subjective:  Sitting up in chair , no issues overnight. Pain controlled. No nausea or sob. No dizziness.     Objective:  /72 (BP Location: Left arm)   Pulse 85   Temp 98.3 °F (36.8 °C) (Oral)   Resp 18   Ht 180.3 cm (5' 11\")   Wt 103.2 kg (227 lb 8.2 oz)   SpO2 95%   BMI 31.73 kg/m²          Intake/Output:    Intake/Output Summary (Last 24 hours) at 3/22/2025 0835  Last data filed at 3/22/2025 0831  Gross per 24 hour   Intake 1183 ml   Output 2210 ml   Net -1027 ml         Last 3 Weights   25 0512 103.2 kg (227 lb 8.2 oz)   25 0811 104.7 kg (230 lb 13.2 oz)   25 0630 106.6 kg (235 lb 1.6 oz)   25 0500 106.4 kg (234 lb 9.1 oz)   25 0900 103.7 kg (228 lb 11.2 oz)   25 1332 106.6 kg (235 lb)   25 1144 106.6 kg (235 lb)   22 1327 101.2 kg (223 lb)           Allergies:  Allergies[1]    Current Facility-Administered Medications   Medication Dose Route Frequency    metoprolol tartrate (Lopressor) tab 50 mg  50 mg Oral 2x Daily(Beta Blocker)    insulin aspart (NovoLOG) 100 Units/mL FlexPen 2-10 Units  2-10 Units Subcutaneous TID AC and HS    HYDROcodone-acetaminophen (Norco) 5-325 MG per tab 1 tablet  1 tablet Oral Q4H PRN    Or    HYDROcodone-acetaminophen (Norco) 5-325 MG per tab 2 tablet  2 tablet Oral Q6H PRN    aspirin DR tab 81 mg  81 mg Oral Daily    rosuvastatin (Crestor) tab 10 mg  10 mg Oral Nightly    melatonin tab 3 mg  3 mg Oral Nightly PRN    sennosides (Senokot) tab 8.6 mg  8.6 mg Oral BID    docusate sodium (Colace) cap 100 mg  100 mg Oral BID    polyethylene glycol (PEG 3350) (Miralax) 17 g oral packet 17 g  17 g Oral Daily PRN    bisacodyl (Dulcolax) 10 MG rectal suppository 10 mg  10 mg Rectal Daily PRN    ondansetron (Zofran) 4 MG/2ML  injection 4 mg  4 mg Intravenous Q6H PRN    mupirocin (Bactroban) 2% nasal ointment 1 Application  1 Application Nasal BID    morphINE PF 2 MG/ML injection 2 mg  2 mg Intravenous Q2H PRN    Or    morphINE PF 4 MG/ML injection 4 mg  4 mg Intravenous Q2H PRN    pantoprazole (Protonix) DR tab 40 mg  40 mg Oral QAM AC    glucose (Dex4) 15 GM/59ML oral liquid 15 g  15 g Oral Q15 Min PRN    Or    glucose (Glutose) 40% oral gel 15 g  15 g Oral Q15 Min PRN    Or    glucose-vitamin C (Dex-4) chewable tab 4 tablet  4 tablet Oral Q15 Min PRN    Or    dextrose 50% injection 50 mL  50 mL Intravenous Q15 Min PRN    Or    glucose (Dex4) 15 GM/59ML oral liquid 30 g  30 g Oral Q15 Min PRN    Or    glucose (Glutose) 40% oral gel 30 g  30 g Oral Q15 Min PRN    Or    glucose-vitamin C (Dex-4) chewable tab 8 tablet  8 tablet Oral Q15 Min PRN       Labs:  Lab Results   Component Value Date    K 4.3 03/22/2025       Physical Exam:    Neuro: NAD intact A/O X4   Lungs: Clear no distress diminished bases comfortable on RA   Heart: RRR S1 S2 sternum stable   Abdomen: Soft non tender BS present no nausea +BM   Extremities:Warm and no edema   Pulses: Palpable pulses   Incisions: Sternotomy C/D/I LE incision C/D/I        Assessment/Plan:  Patient Active Problem List   Diagnosis    GERD (gastroesophageal reflux disease)    Dyslipidemia    Pre-diabetes    Diverticulosis of colon    CAD (coronary artery disease), native coronary artery       POD# 4 S/P CABG x4 with LIMA- LAD, SVG - Diagonal . OM  Ramus ,PDA     - HD stable   - Cardiology following ASA/ BB/Statin   - Vol ol improved wt under baseline   - Renal functioned intact with good UOP   - bowel regimen   - Pain management [place]   - GI PPX protonix   - DVT PPX LILY'S / SCD'S   - Encourage IS/ Ambulation   - PT/OT/ Cardiac rehab  - Ok to discharge from CV standpoint if ok with consults.     D/W Dr.Vlahu Katie MATHUR, RN  3/22/2025  8:35 AM        [1]   Allergies  Allergen Reactions     Pravastatin SWELLING, FATIGUE and OTHER (SEE COMMENTS)     And tingling

## 2025-03-24 NOTE — TELEPHONE ENCOUNTER
SOFIA from patient c/o chest wall discomfort and sweating overnight. He slept poorly d/t this but is feeling better now that he is awake and moving around but still getting sweating episodes sporadically. He states all of his VS are normal now but had some elevated BP overnight. He denies any LEDEZMA and states symptoms actually improve with ambulation. Reviewed w/ Dr. Horn - this is likely d/t PPS, would recommend a Medrol DosePack. Monitor for improvement, call our iffice or call 911 if any troublesome worsening of symptoms or PRN. Rx sent to [facility].

## 2025-03-24 NOTE — DISCHARGE SUMMARY
[facility]  Discharge Summary    Gosia Singer Patient Status:  Inpatient    4/15/1965 MRN [id number]   Location [facility] 8NE-A Attending No att. providers found   Hosp Day # 4 PCP Ida Kebede MD     Admit date: 3/18/2025    Discharge date and time: 3/22/2025 12:34 PM     Discharge Diagnoses:   CAD    Procedures Performed:   CABG    HPI & Hospital Course: Gosia Singer is a 59 year old year old gentleman with CAD who was admitted to the hospital for CABG. Surgery was performed and recovery was uneventful. For more detailed information please see the medical record.     Discharge Condition: Stable     Discharge Instructions:  Pt was instructed not to lift anything heavy and to follow up with Dr. Horn as directed.     Signed:  Masoud Horn MD  3/24/2025

## 2025-03-26 ENCOUNTER — TELEPHONE (OUTPATIENT)
Dept: INTERVENTIONAL RADIOLOGY/VASCULAR | Facility: HOSPITAL | Age: 60
End: 2025-03-26

## 2025-03-26 NOTE — TELEPHONE ENCOUNTER
Called patient to follow up. No issue with Medrol DosePak. He states his pain and sweating episodes have greatly improved. He remains afebrile. He is feeing pretty good actually. I advised he call us with any status change or PRN.

## 2025-03-31 ENCOUNTER — HOSPITAL ENCOUNTER (OUTPATIENT)
Dept: GENERAL RADIOLOGY | Facility: HOSPITAL | Age: 60
Discharge: HOME | End: 2025-03-31
Attending: THORACIC SURGERY (CARDIOTHORACIC VASCULAR SURGERY)
Payer: COMMERCIAL

## 2025-03-31 DIAGNOSIS — J90 PLEURAL EFFUSION: ICD-10-CM

## 2025-03-31 PROCEDURE — 71048 X-RAY EXAM CHEST 4+ VIEWS: CPT | Performed by: THORACIC SURGERY (CARDIOTHORACIC VASCULAR SURGERY)

## 2025-04-01 ENCOUNTER — LAB ENCOUNTER (OUTPATIENT)
Dept: LAB | Facility: HOSPITAL | Age: 60
End: 2025-04-01
Attending: INTERNAL MEDICINE
Payer: COMMERCIAL

## 2025-04-01 DIAGNOSIS — R73.03 PRE-DIABETES: ICD-10-CM

## 2025-04-01 DIAGNOSIS — R61 NIGHT SWEATS: ICD-10-CM

## 2025-04-01 LAB
BASOPHILS # BLD AUTO: 0.07 X10(3) UL (ref 0–0.2)
BASOPHILS NFR BLD AUTO: 0.9 %
EOSINOPHIL # BLD AUTO: 0.3 X10(3) UL (ref 0–0.7)
EOSINOPHIL NFR BLD AUTO: 3.9 %
ERYTHROCYTE [DISTWIDTH] IN BLOOD BY AUTOMATED COUNT: 12.4 %
EST. AVERAGE GLUCOSE BLD GHB EST-MCNC: 146 MG/DL (ref 68–126)
HBA1C MFR BLD: 6.7 % (ref ?–5.7)
HCT VFR BLD AUTO: 42.6 %
HGB BLD-MCNC: 14.2 G/DL
IMM GRANULOCYTES # BLD AUTO: 0.02 X10(3) UL (ref 0–1)
IMM GRANULOCYTES NFR BLD: 0.3 %
LYMPHOCYTES # BLD AUTO: 1.95 X10(3) UL (ref 1–4)
LYMPHOCYTES NFR BLD AUTO: 25.7 %
MCH RBC QN AUTO: 29.6 PG (ref 26–34)
MCHC RBC AUTO-ENTMCNC: 33.3 G/DL (ref 31–37)
MCV RBC AUTO: 88.9 FL
MONOCYTES # BLD AUTO: 0.59 X10(3) UL (ref 0.1–1)
MONOCYTES NFR BLD AUTO: 7.8 %
NEUTROPHILS # BLD AUTO: 4.67 X10 (3) UL (ref 1.5–7.7)
NEUTROPHILS # BLD AUTO: 4.67 X10(3) UL (ref 1.5–7.7)
NEUTROPHILS NFR BLD AUTO: 61.4 %
PLATELET # BLD AUTO: 430 10(3)UL (ref 150–450)
RBC # BLD AUTO: 4.79 X10(6)UL
T4 FREE SERPL-MCNC: 1.4 NG/DL (ref 0.8–1.7)
TSI SER-ACNC: 1.13 UIU/ML (ref 0.55–4.78)
WBC # BLD AUTO: 7.6 X10(3) UL (ref 4–11)

## 2025-04-01 PROCEDURE — 85025 COMPLETE CBC W/AUTO DIFF WBC: CPT

## 2025-04-01 PROCEDURE — 83521 IG LIGHT CHAINS FREE EACH: CPT

## 2025-04-01 PROCEDURE — 86334 IMMUNOFIX E-PHORESIS SERUM: CPT

## 2025-04-01 PROCEDURE — 84165 PROTEIN E-PHORESIS SERUM: CPT

## 2025-04-01 PROCEDURE — 84443 ASSAY THYROID STIM HORMONE: CPT

## 2025-04-01 PROCEDURE — 83036 HEMOGLOBIN GLYCOSYLATED A1C: CPT

## 2025-04-01 PROCEDURE — 84439 ASSAY OF FREE THYROXINE: CPT

## 2025-04-01 PROCEDURE — 36415 COLL VENOUS BLD VENIPUNCTURE: CPT

## 2025-04-01 PROCEDURE — 86480 TB TEST CELL IMMUN MEASURE: CPT

## 2025-04-03 LAB
ALBUMIN SERPL ELPH-MCNC: 3.35 G/DL (ref 3.75–5.21)
ALBUMIN/GLOB SERPL: 0.97 {RATIO} (ref 1–2)
ALPHA1 GLOB SERPL ELPH-MCNC: 0.34 G/DL (ref 0.19–0.46)
ALPHA2 GLOB SERPL ELPH-MCNC: 1 G/DL (ref 0.48–1.05)
B-GLOBULIN SERPL ELPH-MCNC: 0.88 G/DL (ref 0.68–1.23)
GAMMA GLOB SERPL ELPH-MCNC: 1.22 G/DL (ref 0.62–1.7)
KAPPA LC FREE SER-MCNC: 2.68 MG/DL (ref 0.33–1.94)
KAPPA LC FREE/LAMBDA FREE SER NEPH: 1.23 {RATIO} (ref 0.26–1.65)
LAMBDA LC FREE SERPL-MCNC: 2.19 MG/DL (ref 0.57–2.63)
M TB IFN-G CD4+ T-CELLS BLD-ACNC: 0.02 IU/ML
M TB TUBERC IFN-G BLD QL: NEGATIVE
M TB TUBERC IGNF/MITOGEN IGNF CONTROL: 3.06 IU/ML
PROT SERPL-MCNC: 6.8 G/DL (ref 5.7–8.2)
QFT TB1 AG MINUS NIL: 0 IU/ML
QFT TB2 AG MINUS NIL: 0 IU/ML

## 2025-04-15 ENCOUNTER — ORDER TRANSCRIPTION (OUTPATIENT)
Dept: CARDIAC REHAB | Facility: HOSPITAL | Age: 60
End: 2025-04-15

## 2025-04-15 DIAGNOSIS — Z95.1 S/P CABG (CORONARY ARTERY BYPASS GRAFT): Primary | ICD-10-CM

## 2025-04-24 ENCOUNTER — CARDPULM VISIT (OUTPATIENT)
Age: 60
End: 2025-04-24
Attending: INTERNAL MEDICINE
Payer: COMMERCIAL

## 2025-04-28 ENCOUNTER — CARDPULM VISIT (OUTPATIENT)
Age: 60
End: 2025-04-28
Attending: INTERNAL MEDICINE
Payer: COMMERCIAL

## 2025-04-28 PROCEDURE — 93798 PHYS/QHP OP CAR RHAB W/ECG: CPT

## 2025-04-29 ENCOUNTER — APPOINTMENT (OUTPATIENT)
Age: 60
End: 2025-04-29
Attending: INTERNAL MEDICINE
Payer: COMMERCIAL

## 2025-04-30 ENCOUNTER — CARDPULM VISIT (OUTPATIENT)
Age: 60
End: 2025-04-30
Attending: INTERNAL MEDICINE
Payer: COMMERCIAL

## 2025-04-30 PROCEDURE — 93798 PHYS/QHP OP CAR RHAB W/ECG: CPT

## 2025-05-01 ENCOUNTER — APPOINTMENT (OUTPATIENT)
Age: 60
End: 2025-05-01
Attending: INTERNAL MEDICINE
Payer: COMMERCIAL

## 2025-05-02 ENCOUNTER — CARDPULM VISIT (OUTPATIENT)
Age: 60
End: 2025-05-02
Attending: INTERNAL MEDICINE
Payer: COMMERCIAL

## 2025-05-02 PROCEDURE — 93798 PHYS/QHP OP CAR RHAB W/ECG: CPT

## 2025-05-05 ENCOUNTER — CARDPULM VISIT (OUTPATIENT)
Age: 60
End: 2025-05-05
Attending: INTERNAL MEDICINE
Payer: COMMERCIAL

## 2025-05-05 PROCEDURE — 93798 PHYS/QHP OP CAR RHAB W/ECG: CPT

## 2025-05-06 ENCOUNTER — APPOINTMENT (OUTPATIENT)
Age: 60
End: 2025-05-06
Attending: INTERNAL MEDICINE
Payer: COMMERCIAL

## 2025-05-07 ENCOUNTER — CARDPULM VISIT (OUTPATIENT)
Age: 60
End: 2025-05-07
Attending: INTERNAL MEDICINE
Payer: COMMERCIAL

## 2025-05-07 PROCEDURE — 93798 PHYS/QHP OP CAR RHAB W/ECG: CPT

## 2025-05-08 ENCOUNTER — APPOINTMENT (OUTPATIENT)
Age: 60
End: 2025-05-08
Attending: INTERNAL MEDICINE
Payer: COMMERCIAL

## 2025-05-09 ENCOUNTER — CARDPULM VISIT (OUTPATIENT)
Age: 60
End: 2025-05-09
Attending: INTERNAL MEDICINE
Payer: COMMERCIAL

## 2025-05-09 PROCEDURE — 93798 PHYS/QHP OP CAR RHAB W/ECG: CPT

## 2025-05-12 ENCOUNTER — CARDPULM VISIT (OUTPATIENT)
Age: 60
End: 2025-05-12
Attending: INTERNAL MEDICINE
Payer: COMMERCIAL

## 2025-05-12 PROCEDURE — 93798 PHYS/QHP OP CAR RHAB W/ECG: CPT

## 2025-05-13 ENCOUNTER — APPOINTMENT (OUTPATIENT)
Age: 60
End: 2025-05-13
Attending: INTERNAL MEDICINE
Payer: COMMERCIAL

## 2025-05-14 ENCOUNTER — CARDPULM VISIT (OUTPATIENT)
Age: 60
End: 2025-05-14
Attending: INTERNAL MEDICINE
Payer: COMMERCIAL

## 2025-05-14 PROCEDURE — 93798 PHYS/QHP OP CAR RHAB W/ECG: CPT

## 2025-05-15 ENCOUNTER — APPOINTMENT (OUTPATIENT)
Age: 60
End: 2025-05-15
Attending: INTERNAL MEDICINE
Payer: COMMERCIAL

## 2025-05-16 ENCOUNTER — CARDPULM VISIT (OUTPATIENT)
Age: 60
End: 2025-05-16
Attending: INTERNAL MEDICINE
Payer: COMMERCIAL

## 2025-05-16 PROCEDURE — 93798 PHYS/QHP OP CAR RHAB W/ECG: CPT

## 2025-05-19 ENCOUNTER — CARDPULM VISIT (OUTPATIENT)
Age: 60
End: 2025-05-19
Attending: INTERNAL MEDICINE
Payer: COMMERCIAL

## 2025-05-19 PROCEDURE — 93798 PHYS/QHP OP CAR RHAB W/ECG: CPT

## 2025-05-21 ENCOUNTER — CARDPULM VISIT (OUTPATIENT)
Age: 60
End: 2025-05-21
Attending: INTERNAL MEDICINE
Payer: COMMERCIAL

## 2025-05-21 PROCEDURE — 93798 PHYS/QHP OP CAR RHAB W/ECG: CPT

## 2025-05-23 ENCOUNTER — CARDPULM VISIT (OUTPATIENT)
Age: 60
End: 2025-05-23
Attending: INTERNAL MEDICINE
Payer: COMMERCIAL

## 2025-05-23 PROCEDURE — 93798 PHYS/QHP OP CAR RHAB W/ECG: CPT

## 2025-05-26 ENCOUNTER — APPOINTMENT (OUTPATIENT)
Age: 60
End: 2025-05-26
Attending: INTERNAL MEDICINE
Payer: COMMERCIAL

## 2025-05-28 ENCOUNTER — CARDPULM VISIT (OUTPATIENT)
Age: 60
End: 2025-05-28
Attending: INTERNAL MEDICINE
Payer: COMMERCIAL

## 2025-05-28 PROCEDURE — 93798 PHYS/QHP OP CAR RHAB W/ECG: CPT

## 2025-05-30 ENCOUNTER — CARDPULM VISIT (OUTPATIENT)
Age: 60
End: 2025-05-30
Attending: INTERNAL MEDICINE
Payer: COMMERCIAL

## 2025-05-30 PROCEDURE — 93798 PHYS/QHP OP CAR RHAB W/ECG: CPT

## 2025-06-02 ENCOUNTER — CARDPULM VISIT (OUTPATIENT)
Age: 60
End: 2025-06-02
Attending: INTERNAL MEDICINE
Payer: COMMERCIAL

## 2025-06-02 PROCEDURE — 93798 PHYS/QHP OP CAR RHAB W/ECG: CPT

## 2025-06-04 ENCOUNTER — CARDPULM VISIT (OUTPATIENT)
Age: 60
End: 2025-06-04
Attending: INTERNAL MEDICINE
Payer: COMMERCIAL

## 2025-06-04 PROCEDURE — 93798 PHYS/QHP OP CAR RHAB W/ECG: CPT

## 2025-06-06 ENCOUNTER — CARDPULM VISIT (OUTPATIENT)
Age: 60
End: 2025-06-06
Attending: INTERNAL MEDICINE
Payer: COMMERCIAL

## 2025-06-06 PROCEDURE — 93798 PHYS/QHP OP CAR RHAB W/ECG: CPT

## 2025-06-09 ENCOUNTER — CARDPULM VISIT (OUTPATIENT)
Age: 60
End: 2025-06-09
Attending: INTERNAL MEDICINE
Payer: COMMERCIAL

## 2025-06-09 PROCEDURE — 93798 PHYS/QHP OP CAR RHAB W/ECG: CPT

## 2025-06-11 ENCOUNTER — CARDPULM VISIT (OUTPATIENT)
Age: 60
End: 2025-06-11
Attending: INTERNAL MEDICINE
Payer: COMMERCIAL

## 2025-06-11 PROCEDURE — 93798 PHYS/QHP OP CAR RHAB W/ECG: CPT

## 2025-06-13 ENCOUNTER — CARDPULM VISIT (OUTPATIENT)
Age: 60
End: 2025-06-13
Attending: INTERNAL MEDICINE
Payer: COMMERCIAL

## 2025-06-13 PROCEDURE — 93798 PHYS/QHP OP CAR RHAB W/ECG: CPT

## 2025-06-16 ENCOUNTER — CARDPULM VISIT (OUTPATIENT)
Age: 60
End: 2025-06-16
Attending: INTERNAL MEDICINE
Payer: COMMERCIAL

## 2025-06-16 PROCEDURE — 93798 PHYS/QHP OP CAR RHAB W/ECG: CPT

## 2025-06-18 ENCOUNTER — CARDPULM VISIT (OUTPATIENT)
Age: 60
End: 2025-06-18
Attending: INTERNAL MEDICINE
Payer: COMMERCIAL

## 2025-06-18 PROCEDURE — 93798 PHYS/QHP OP CAR RHAB W/ECG: CPT

## 2025-06-20 ENCOUNTER — CARDPULM VISIT (OUTPATIENT)
Age: 60
End: 2025-06-20
Attending: INTERNAL MEDICINE
Payer: COMMERCIAL

## 2025-06-20 PROCEDURE — 93798 PHYS/QHP OP CAR RHAB W/ECG: CPT

## 2025-06-23 ENCOUNTER — CARDPULM VISIT (OUTPATIENT)
Age: 60
End: 2025-06-23
Attending: INTERNAL MEDICINE
Payer: COMMERCIAL

## 2025-06-23 PROCEDURE — 93798 PHYS/QHP OP CAR RHAB W/ECG: CPT

## 2025-06-25 ENCOUNTER — CARDPULM VISIT (OUTPATIENT)
Age: 60
End: 2025-06-25
Attending: INTERNAL MEDICINE
Payer: COMMERCIAL

## 2025-06-25 PROCEDURE — 93798 PHYS/QHP OP CAR RHAB W/ECG: CPT

## 2025-06-27 ENCOUNTER — CARDPULM VISIT (OUTPATIENT)
Age: 60
End: 2025-06-27
Attending: INTERNAL MEDICINE
Payer: COMMERCIAL

## 2025-06-27 PROCEDURE — 93798 PHYS/QHP OP CAR RHAB W/ECG: CPT

## 2025-06-30 ENCOUNTER — CARDPULM VISIT (OUTPATIENT)
Age: 60
End: 2025-06-30
Attending: INTERNAL MEDICINE
Payer: COMMERCIAL

## 2025-06-30 PROCEDURE — 93798 PHYS/QHP OP CAR RHAB W/ECG: CPT

## 2025-07-02 ENCOUNTER — CARDPULM VISIT (OUTPATIENT)
Age: 60
End: 2025-07-02
Attending: INTERNAL MEDICINE
Payer: COMMERCIAL

## 2025-07-02 PROCEDURE — 93798 PHYS/QHP OP CAR RHAB W/ECG: CPT

## 2025-07-04 ENCOUNTER — APPOINTMENT (OUTPATIENT)
Age: 60
End: 2025-07-04
Attending: INTERNAL MEDICINE
Payer: COMMERCIAL

## 2025-07-07 ENCOUNTER — APPOINTMENT (OUTPATIENT)
Age: 60
End: 2025-07-07
Attending: INTERNAL MEDICINE
Payer: COMMERCIAL

## 2025-07-09 ENCOUNTER — APPOINTMENT (OUTPATIENT)
Age: 60
End: 2025-07-09
Attending: INTERNAL MEDICINE
Payer: COMMERCIAL

## 2025-07-11 ENCOUNTER — APPOINTMENT (OUTPATIENT)
Age: 60
End: 2025-07-11
Attending: INTERNAL MEDICINE
Payer: COMMERCIAL

## 2025-07-14 ENCOUNTER — APPOINTMENT (OUTPATIENT)
Age: 60
End: 2025-07-14
Attending: INTERNAL MEDICINE
Payer: COMMERCIAL

## 2025-07-16 ENCOUNTER — APPOINTMENT (OUTPATIENT)
Age: 60
End: 2025-07-16
Attending: INTERNAL MEDICINE
Payer: COMMERCIAL

## 2025-07-18 ENCOUNTER — APPOINTMENT (OUTPATIENT)
Age: 60
End: 2025-07-18
Attending: INTERNAL MEDICINE
Payer: COMMERCIAL

## 2025-07-21 ENCOUNTER — APPOINTMENT (OUTPATIENT)
Age: 60
End: 2025-07-21
Attending: INTERNAL MEDICINE
Payer: COMMERCIAL

## 2025-07-23 ENCOUNTER — APPOINTMENT (OUTPATIENT)
Age: 60
End: 2025-07-23
Attending: INTERNAL MEDICINE
Payer: COMMERCIAL

## 2025-07-25 ENCOUNTER — APPOINTMENT (OUTPATIENT)
Age: 60
End: 2025-07-25
Attending: INTERNAL MEDICINE
Payer: COMMERCIAL

## 2025-07-28 ENCOUNTER — APPOINTMENT (OUTPATIENT)
Age: 60
End: 2025-07-28
Attending: INTERNAL MEDICINE
Payer: COMMERCIAL

## (undated) DIAGNOSIS — Z13.228 SCREENING FOR ENDOCRINE, NUTRITIONAL, METABOLIC AND IMMUNITY DISORDER: ICD-10-CM

## (undated) DIAGNOSIS — Z12.5 SCREENING FOR PROSTATE CANCER: ICD-10-CM

## (undated) DIAGNOSIS — Z13.29 SCREENING FOR THYROID DISORDER: ICD-10-CM

## (undated) DIAGNOSIS — Z13.21 SCREENING FOR ENDOCRINE, NUTRITIONAL, METABOLIC AND IMMUNITY DISORDER: ICD-10-CM

## (undated) DIAGNOSIS — Z13.0 SCREENING FOR ENDOCRINE, NUTRITIONAL, METABOLIC AND IMMUNITY DISORDER: ICD-10-CM

## (undated) DIAGNOSIS — E78.5 DYSLIPIDEMIA: ICD-10-CM

## (undated) DIAGNOSIS — E11.9 TYPE 2 DIABETES MELLITUS WITHOUT COMPLICATION, WITHOUT LONG-TERM CURRENT USE OF INSULIN (HCC): ICD-10-CM

## (undated) DIAGNOSIS — E78.5 DYSLIPIDEMIA: Primary | ICD-10-CM

## (undated) DIAGNOSIS — Z13.29 SCREENING FOR ENDOCRINE, NUTRITIONAL, METABOLIC AND IMMUNITY DISORDER: ICD-10-CM

## (undated) DIAGNOSIS — Z00.00 LABORATORY EXAM ORDERED AS PART OF ROUTINE GENERAL MEDICAL EXAMINATION: ICD-10-CM

## (undated) DEVICE — OPEN HEART: Brand: MEDLINE INDUSTRIES, INC.

## (undated) DEVICE — SOLUTION IRRIG 1000ML 0.9% NACL USP BTL

## (undated) DEVICE — STERILE POLYISOPRENE POWDER-FREE SURGICAL GLOVES: Brand: PROTEXIS

## (undated) DEVICE — CABLE PT L10FT ELECTRD PIN DIA1-2MM VENTRICLE

## (undated) DEVICE — SYRINGE MED 30ML STD CLR PLAS LL TIP N CTRL

## (undated) DEVICE — WECK HORIZON LARGE ORANGE CLIP DISP

## (undated) DEVICE — CABLE SUR L12IN SM CLP LNG DISP

## (undated) DEVICE — LACTATED R 1000ML INJ

## (undated) DEVICE — SUT PROL 7-0 18IN CC NABSRB BLU L9.3MM 3/8 CI

## (undated) DEVICE — GOWN,SIRUS,FABRNF,XL,20/CS: Brand: MEDLINE

## (undated) DEVICE — CELL SAVER RESERVOIR

## (undated) DEVICE — CELL SAVER TUBING

## (undated) DEVICE — SUT PROL 6-0 18IN C-1 NABSRB BLU 13MM 3/8 CIR

## (undated) DEVICE — CLIP INT USE SM TI LIG HORZ

## (undated) DEVICE — Device: Brand: INTELLICART™

## (undated) DEVICE — SUT PROL 4-0 36IN V-5 NABSRB BLU L17MM 1/2 CI

## (undated) DEVICE — SUT 6 DBL WIRE 14IN CCS-1 NABSRB L49MM 1/2 CI

## (undated) DEVICE — GLOVE SUR 7 BIOGEL PI ORTHOPRO PIP BRN PWD F

## (undated) DEVICE — LEAD PACE 475MM CHN A OR V MYOCARDIAL STEROID

## (undated) DEVICE — Device

## (undated) DEVICE — SUT POLYDEK 2-0 75CM NABSRB GRN

## (undated) DEVICE — [HIGH FLOW INSUFFLATOR,  DO NOT USE IF PACKAGE IS DAMAGED,  KEEP DRY,  KEEP AWAY FROM SUNLIGHT,  PROTECT FROM HEAT AND RADIOACTIVE SOURCES.]: Brand: PNEUMOSURE

## (undated) DEVICE — BATTERY PACK FOR VARISPEED: Brand: STRYKER VARISPEED

## (undated) DEVICE — SUT PROL 8-0 18IN BV130-5 NABSRB BLU 6.5MM 3/

## (undated) DEVICE — Device: Brand: VIRTUOSAPH PLUS WITH RADIAL INDICATION

## (undated) DEVICE — TRANSFER PACK CONTAINER 600 ML WITH COUPLER. STERILE FLUID PATH: Brand: FENWAL TRANSFER PACK CONTAINER 600 ML WITH COUPLER

## (undated) NOTE — MR AVS SNAPSHOT
[facility] [address] [address] [phone]               Thank you for choosing us for your health care visit with Marko Navas MD.  We are glad to serve you and happy to provide you with this summa [address] [place] [address] [place]  [address]     Phone:  [phone]    - Rosuvastatin Calcium 5 MG Tabs            MyChart     Visit MyChart  You can access your MyChart to more actively manage your health care and view more details from 2 ½ hours per week – spread out over time Use a vinod to keep you motivated   Don’t forget strength training with weights and resistance Set goals and track your progress   You don’t need to join a gym. Home exercises work great.  Put more priority on exe

## (undated) NOTE — LETTER
Chepe Ayon M.D., F.A.C.S.  Javier Syed M.D., F.A.C.S.  Jarod Macario M.D.   Shailesh Agustin M.D., F.A.C.S.  WILNER Vines M.D., F.A.C.S.   Lawrence Melendrez M.D., F.A.C.S.  Marquez Omalley M.D.    WILNER Bates M.D.   WILNER Wild M.D., M.B.A.  Braden Peter M.D.  WILNER Downey M.D., F.A.C.S.  Masoud Horn M.D., F.A.C.S.   WILNER Sexton M.D., F.A.C.S., F.A.C.C. Jerad Vincent M.D.  Obey Lopez M.D.    TERLEL Allen D.O., F.A.C.S.  Micah Osborne M.D.   Rishi York M.D.  Hu Noyola M.D.    Guillaume Molina M.D., F.A.C.S.        Welcome to [facility]    As you contemplate possible surgical treatment, it is very important to us that you understand fully what is being discussed, that all of your questions have been answered, and that your options for treatment have been fully explained.    To that end, on the following page we will ask you some questions to make certain that you understand everything which has been explained to you. Included in this understanding is that there are both surgical and nonsurgical treatments available for you, that you have options regarding where your care is given, and what doctors are involved in your care. Included in these options would, of course, be the option to elect for no treatment whatsoever. We especially want to be sure that you have had a chance to have all of your questions and concerns answered. If there are any issues which have not been adequately addressed, we ask you to bring them forward so that we can thoroughly address them.    A patient who is fully informed and understands their condition and options for treatment, as well as potential adverse effects of treatment, is going to be a patient who receives the most benefit out of care rendered.  Our goal in addition to providing excellent surgical care is to provide the necessary information to you and your family in order to make decisions which are appropriate relative to your own care.    Please take the time necessary to read and answer the questions on the next page. Again, if you have any questions, bring them forward and we will certainly address them.    Sincerely,    [facility]    Date:___________________ _______________________ _______________________       Printed Patient Name  Signature of Patient    Date:___________________ _______________________ _______________________       Printed Witness Name  Signature of Witness    _______________________       Relationship of Witness to Patient        Consent Form Page: 1 of 2  [address] [facility] * [facility] [address] * [place] [address] * [phone] / [phone] *  Fax [phone] * Billing Fax [phone] Version: 9/9/2024    [facility]  Supplemental Consent Form    A [facility] surgeon has met with me and explained the matter of my illness, and what treatments might be available to improve my condition. As a result of that conversation, I understand the following:    A [facility] surgeon met with me and explained, in detail, the nature of my condition for which surgery is being contemplated. The procedure being performed is:  CORONARY ARTERY BYPASS GRAFT     Yes _____ No _____    A [facility] surgeon met with me and explained to me that there are alternatives to surgery which may include no surgery, medical therapy, or interventional treatment, among other options and the risks and benefits of the different treatment options:Yes _____ No _____    A [facility] surgeon has explained to me that if I should desire, he/she is willing to explain my case and the surgical and non-surgical options to family members:            Yes _____ No _____    A [facility] surgeon has answered all of my questions  regarding the topics we have discussed. I have been invited to ask more questions:  Yes _____ No _____    A [facility] surgeon has explained to me that if I seek other options or wish treatment at elsewhere, that his/her office will assist me in making such recommendations:  Yes _____ No _____    A [facility] surgeon has explained to me that death, risk of bleeding, stroke, multi-organ failure, heart attack and/or other complications are risks for the proposed surgical procedure:        Yes _____ No _____    A [facility] surgeon has explained to me that I have the right to cancel or postpone the surgery at any time prior to the start of surgery:    Yes _____ No _____    The nature and options for treatment for my condition has been explained to me, in detail, by a [facility] surgeon and all questions have been answered to my satisfaction. I understand that I am not required to undergo surgery, and further, that if I so desire, I could have surgery accomplished by another surgeon or at another institution. I understand and accept that which has been explained to me. I am able to make my decisions knowingly and willingly based on the data.    Date:___________________ _______________________ _______________________       Printed Patient Name  Signature of Patient    Date:___________________ _______________________ _______________________       Printed Witness Name  Signature of Witness    _______________________   Relationship of Witness to Patient          Consent Form Page: 3 of  2  [address] [facility] * [address] * [place] [address] * [phone] / [phone] *  Fax [phone] * Billing Fax [phone] Version: 9/9/2024

## (undated) NOTE — MR AVS SNAPSHOT
[facility]  [address] [address] [phone]               Thank you for choosing us for your health care visit with Jennifer Smiley MD.  We are glad to serve you and happy to provide you with this summa You can access your MyChart to more actively manage your health care and view more details from this visit by going to [email or web address].   If you've recently had a stay at the Hospital you can access your discharge instructions in [address] by jovan